# Patient Record
Sex: FEMALE | Race: BLACK OR AFRICAN AMERICAN | NOT HISPANIC OR LATINO | Employment: UNEMPLOYED | URBAN - METROPOLITAN AREA
[De-identification: names, ages, dates, MRNs, and addresses within clinical notes are randomized per-mention and may not be internally consistent; named-entity substitution may affect disease eponyms.]

---

## 2020-04-21 DIAGNOSIS — Z01.84 ANTIBODY RESPONSE EXAMINATION: ICD-10-CM

## 2020-05-21 DIAGNOSIS — Z01.84 ANTIBODY RESPONSE EXAMINATION: ICD-10-CM

## 2020-06-20 DIAGNOSIS — Z01.84 ANTIBODY RESPONSE EXAMINATION: ICD-10-CM

## 2020-07-20 DIAGNOSIS — Z01.84 ANTIBODY RESPONSE EXAMINATION: ICD-10-CM

## 2020-08-19 DIAGNOSIS — Z01.84 ANTIBODY RESPONSE EXAMINATION: ICD-10-CM

## 2020-09-18 DIAGNOSIS — Z01.84 ANTIBODY RESPONSE EXAMINATION: ICD-10-CM

## 2020-10-18 DIAGNOSIS — Z01.84 ANTIBODY RESPONSE EXAMINATION: ICD-10-CM

## 2020-11-17 DIAGNOSIS — Z01.84 ANTIBODY RESPONSE EXAMINATION: ICD-10-CM

## 2021-07-30 ENCOUNTER — IMMUNIZATION (OUTPATIENT)
Dept: INTERNAL MEDICINE | Facility: CLINIC | Age: 41
End: 2021-07-30
Payer: MEDICAID

## 2021-07-30 DIAGNOSIS — Z23 NEED FOR VACCINATION: Primary | ICD-10-CM

## 2021-07-30 PROCEDURE — 0011A COVID-19, MRNA, LNP-S, PF, 100 MCG/0.5 ML DOSE VACCINE: CPT | Mod: PBBFAC,PO

## 2021-08-27 ENCOUNTER — CLINICAL SUPPORT (OUTPATIENT)
Dept: URGENT CARE | Facility: CLINIC | Age: 41
End: 2021-08-27
Payer: MEDICAID

## 2021-08-27 ENCOUNTER — IMMUNIZATION (OUTPATIENT)
Dept: INTERNAL MEDICINE | Facility: CLINIC | Age: 41
End: 2021-08-27
Payer: OTHER GOVERNMENT

## 2021-08-27 DIAGNOSIS — Z11.52 ENCOUNTER FOR SCREENING LABORATORY TESTING FOR COVID-19 VIRUS: Primary | ICD-10-CM

## 2021-08-27 DIAGNOSIS — Z23 NEED FOR VACCINATION: Primary | ICD-10-CM

## 2021-08-27 LAB
CTP QC/QA: YES
SARS-COV-2 RDRP RESP QL NAA+PROBE: NEGATIVE

## 2021-08-27 PROCEDURE — U0002: ICD-10-PCS | Mod: QW,S$GLB,, | Performed by: PHYSICIAN ASSISTANT

## 2021-08-27 PROCEDURE — 0012A COVID-19, MRNA, LNP-S, PF, 100 MCG/0.5 ML DOSE VACCINE: CPT | Mod: CV19,,, | Performed by: INTERNAL MEDICINE

## 2021-08-27 PROCEDURE — U0002 COVID-19 LAB TEST NON-CDC: HCPCS | Mod: QW,S$GLB,, | Performed by: PHYSICIAN ASSISTANT

## 2021-08-27 PROCEDURE — 91301 COVID-19, MRNA, LNP-S, PF, 100 MCG/0.5 ML DOSE VACCINE: ICD-10-PCS | Mod: ,,, | Performed by: INTERNAL MEDICINE

## 2021-08-27 PROCEDURE — 91301 COVID-19, MRNA, LNP-S, PF, 100 MCG/0.5 ML DOSE VACCINE: CPT | Mod: ,,, | Performed by: INTERNAL MEDICINE

## 2021-08-27 PROCEDURE — 0012A COVID-19, MRNA, LNP-S, PF, 100 MCG/0.5 ML DOSE VACCINE: ICD-10-PCS | Mod: CV19,,, | Performed by: INTERNAL MEDICINE

## 2021-12-27 ENCOUNTER — HOSPITAL ENCOUNTER (EMERGENCY)
Facility: HOSPITAL | Age: 41
Discharge: HOME | End: 2021-12-27
Attending: EMERGENCY MEDICINE
Payer: OTHER GOVERNMENT

## 2021-12-27 VITALS
RESPIRATION RATE: 15 BRPM | TEMPERATURE: 100 F | HEART RATE: 97 BPM | DIASTOLIC BLOOD PRESSURE: 87 MMHG | SYSTOLIC BLOOD PRESSURE: 134 MMHG | OXYGEN SATURATION: 99 %

## 2021-12-27 DIAGNOSIS — U07.1 COVID-19: Primary | ICD-10-CM

## 2021-12-27 LAB
CTP QC/QA: YES
SARS-COV-2 RDRP RESP QL NAA+PROBE: POSITIVE

## 2021-12-27 PROCEDURE — 99282 EMERGENCY DEPT VISIT SF MDM: CPT | Mod: 25

## 2021-12-27 PROCEDURE — U0002 COVID-19 LAB TEST NON-CDC: HCPCS | Performed by: EMERGENCY MEDICINE

## 2021-12-27 NOTE — Clinical Note
"Yesi"Willow Brandon was seen and treated in our emergency department on 12/27/2021.     COVID-19 is present in our communities across the state. There is limited testing for COVID at this time, so not all patients can be tested. In this situation, your employee meets the following criteria:    Yesi Brandon has met the criteria for COVID-19 testing and has a POSITIVE result. She can return to work once they are asymptomatic for 72 hours without the use of fever reducing medications AND at least ten days from the first positive result.     If you have any questions or concerns, or if I can be of further assistance, please do not hesitate to contact me.    Sincerely,             Kenny Diego PA-C"

## 2021-12-28 NOTE — ED PROVIDER NOTES
Encounter Date: 12/27/2021       History     Chief Complaint   Patient presents with    COVID-19 Concerns     + exposure, + HA, and cough      41-year-old female presents ED with concern of COVID after known exposure.  She reports 2-3 day onset of intermittent headaches, cough, nasal congestion.  No shortness of breath, abdominal pain, nausea, vomiting, loss in taste or smell.  She states she is otherwise feeling well at this time with no other acute complaints.        Review of patient's allergies indicates:  No Known Allergies  No past medical history on file.  No past surgical history on file.  No family history on file.     Review of Systems   Constitutional: Negative for chills and fever.   HENT: Positive for congestion. Negative for sore throat.    Eyes: Negative for visual disturbance.   Respiratory: Positive for cough. Negative for shortness of breath.    Gastrointestinal: Negative for abdominal pain, nausea and vomiting.   Musculoskeletal: Positive for myalgias.   Neurological: Negative for headaches.       Physical Exam     Initial Vitals [12/27/21 1645]   BP Pulse Resp Temp SpO2   134/87 97 15 99.9 °F (37.7 °C) 99 %      MAP       --         Physical Exam    Constitutional: Vital signs are normal. She appears well-developed and well-nourished. She is cooperative.  Non-toxic appearance. She does not have a sickly appearance. She does not appear ill. No distress.   HENT:   Head: Normocephalic and atraumatic.   Eyes: EOM are normal.   Neck:   Normal range of motion.  Cardiovascular: Normal rate.   Abdominal: Normal appearance.   Musculoskeletal:      Cervical back: Normal range of motion.     Neurological: She is alert and oriented to person, place, and time. GCS eye subscore is 4. GCS verbal subscore is 5. GCS motor subscore is 6.   Psychiatric: She has a normal mood and affect. Her behavior is normal. Thought content normal. Cognition and memory are normal.         ED Course   Procedures  Labs Reviewed    SARS-COV-2 RDRP GENE - Abnormal; Notable for the following components:       Result Value    POC Rapid COVID Positive (*)     All other components within normal limits          Imaging Results    None          Medications - No data to display  Medical Decision Making:   Initial Assessment:   Patient presents with concern of COVID, reporting 2-3 day onset of COVID like symptoms.  She is vaccinated.  Known exposure.  Vitals grossly unremarkable.  Patient well-appearing.  Differential Diagnosis:   COVID  ED Management:  COVID test is positive.  Results discussed with patient.  She is otherwise well-appearing and stable for discharge home at this time.  Encouraged Tylenol/ibuprofen as needed, oral hydration, rest, continue social distancing.  ED return precautions discussed.                      Clinical Impression:   Final diagnoses:  [U07.1] COVID-19 (Primary)          ED Disposition Condition    Discharge Stable        ED Prescriptions     None        Follow-up Information     Follow up With Specialties Details Why Contact Info    Your Doctor               Kenny Diego PA-C  12/27/21 3995

## 2021-12-28 NOTE — ED NOTES
Pt presents to ED requesting to be tested for Covid.        Covid symptoms: HA and cough; ambulatory. No distress, chest pain, or SOB noted. Possible, recent exposure reported. Pt stable to be seen and discharged by ED provider. Will continue to monitor.

## 2021-12-28 NOTE — DISCHARGE INSTRUCTIONS

## 2022-08-23 ENCOUNTER — HOSPITAL ENCOUNTER (EMERGENCY)
Facility: HOSPITAL | Age: 42
Discharge: HOME | End: 2022-08-23
Attending: EMERGENCY MEDICINE
Payer: MEDICAID

## 2022-08-23 VITALS
SYSTOLIC BLOOD PRESSURE: 147 MMHG | RESPIRATION RATE: 18 BRPM | TEMPERATURE: 99 F | HEART RATE: 80 BPM | WEIGHT: 200 LBS | OXYGEN SATURATION: 97 % | DIASTOLIC BLOOD PRESSURE: 86 MMHG

## 2022-08-23 DIAGNOSIS — M79.89 LEG SWELLING: ICD-10-CM

## 2022-08-23 DIAGNOSIS — M71.21 POPLITEAL CYST, RIGHT: Primary | ICD-10-CM

## 2022-08-23 DIAGNOSIS — M25.561 RIGHT KNEE PAIN: ICD-10-CM

## 2022-08-23 PROCEDURE — 99284 PR EMERGENCY DEPT VISIT,LEVEL IV: ICD-10-PCS | Mod: ,,, | Performed by: PHYSICIAN ASSISTANT

## 2022-08-23 PROCEDURE — 99284 EMERGENCY DEPT VISIT MOD MDM: CPT | Mod: ,,, | Performed by: PHYSICIAN ASSISTANT

## 2022-08-23 PROCEDURE — 99282 EMERGENCY DEPT VISIT SF MDM: CPT | Mod: 25

## 2022-08-23 PROCEDURE — 25000003 PHARM REV CODE 250: Performed by: PHYSICIAN ASSISTANT

## 2022-08-23 RX ORDER — IBUPROFEN 600 MG/1
600 TABLET ORAL
Status: COMPLETED | OUTPATIENT
Start: 2022-08-23 | End: 2022-08-23

## 2022-08-23 RX ORDER — ACETAMINOPHEN 500 MG
1000 TABLET ORAL
Status: COMPLETED | OUTPATIENT
Start: 2022-08-23 | End: 2022-08-23

## 2022-08-23 RX ADMIN — IBUPROFEN 600 MG: 600 TABLET ORAL at 06:08

## 2022-08-23 RX ADMIN — ACETAMINOPHEN 1000 MG: 500 TABLET ORAL at 06:08

## 2022-08-23 NOTE — FIRST PROVIDER EVALUATION
Medical screening exam completed.  I have conducted a focused provider triage encounter, findings are as follows:    Brief history of present illness:  Right lower extremity pain and swelling x3 days.  No chest pain or shortness of breath    Vitals:    08/23/22 1628   Pulse: 80   Resp: 18   Temp: 98.8 °F (37.1 °C)   TempSrc: Oral   SpO2: 97%   Weight: 90.7 kg (200 lb)       Pertinent physical exam:  1 to 2+ edema to patient's right calf    Brief workup plan:  Right lower extremity Doppler to rule out DVT    Preliminary workup initiated; this workup will be continued and followed by the physician or advanced practice provider that is assigned to the patient when roomed.

## 2022-08-23 NOTE — ED NOTES
Sunday, right knee started swelling with pain to right calf.  Today, pt reports pain to posterior right knee and calf, pain increases on ambulation.  Pt denies trauma.      LOC: The patient is awake, alert and aware of environment with an appropriate affect, the patient is oriented x 3 and speaking appropriately.  APPEARANCE: Patient resting comfortably and in no acute distress, patient is clean and well groomed, patient's clothing is properly fastened.  SKIN: The skin is warm and dry, color consistent with ethnicity, patient has normal skin turgor and moist mucus membranes, skin intact, no breakdown or bruising noted.  MUSCULOSKELETAL: Patient moving all extremities spontaneously, no obvious swelling or deformities noted.  + pain to right calf  RESPIRATORY: Airway is open and patent, respirations are spontaneous, patient has a normal effort and rate, no accessory muscle use noted.

## 2022-08-23 NOTE — Clinical Note
"Yesi Martinalondra Chapman was seen and treated in our emergency department on 8/23/2022.  She may return to work on 08/24/2022.       If you have any questions or concerns, please don't hesitate to call.      Greg Gaytan PA-C"

## 2022-08-23 NOTE — ED PROVIDER NOTES
Encounter Date: 8/23/2022       History     Chief Complaint   Patient presents with    Leg Pain     Right leg pain, denies injury. X3 days. Reports tightness in leg, swelling noted.      The history is provided by the patient and medical records. No  was used.      Yesi Chapman is a 42 y.o. female with no reported medical history presenting to the ED with the chief complaint of right leg pain.     Patient reports atraumatic R knee pain for the past 3 days. Worsens with standing up after sitting down for a long time. Feels the top part of her knee is swollen. 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 Denies history of DVT/PE, birth control or estrogen supplementation, malignancy, recent travel/surgeries. No chest pain or SOB. No numbness, paresthesias, extremity weakness. History of tubal ligation.    Review of patient's allergies indicates:  No Known Allergies  History reviewed. No pertinent past medical history.  History reviewed. No pertinent surgical history.  History reviewed. No pertinent family history.     Review of Systems   Constitutional: Negative for fever.   HENT: Negative for sore throat.    Eyes: Negative for pain.   Respiratory: Negative for shortness of breath.    Cardiovascular: Negative for chest pain.   Gastrointestinal: Negative for nausea.   Genitourinary: Negative for dysuria.   Musculoskeletal: Positive for arthralgias. Negative for back pain.   Skin: Negative for rash.   Neurological: Negative for weakness.   Hematological: Does not bruise/bleed easily.       Physical Exam     Initial Vitals   BP Pulse Resp Temp SpO2   08/23/22 1629 08/23/22 1628 08/23/22 1628 08/23/22 1628 08/23/22 1628   (!) 147/86 80 18 98.8 °F (37.1 °C) 97 %      MAP       --                Physical Exam    Constitutional: She appears well-developed and well-nourished. She is not diaphoretic. No distress.   HENT:   Head: Normocephalic and atraumatic.   Mouth/Throat:  Oropharynx is clear and moist. No oropharyngeal exudate.   Eyes: Conjunctivae and EOM are normal. Pupils are equal, round, and reactive to light. No scleral icterus.   Neck: Neck supple.   Normal range of motion.  Cardiovascular: Normal rate and regular rhythm.   +2 DP pulses BLE   Pulmonary/Chest: Breath sounds normal. No respiratory distress. She has no wheezes.   Abdominal: Abdomen is soft. She exhibits no distension. There is no abdominal tenderness. There is no rebound.   Musculoskeletal:         General: Edema present. No tenderness. Normal range of motion.      Cervical back: Normal range of motion and neck supple.      Comments: Suprapatellar swelling to RLE. Full A/P ROM of lower extremities     Neurological: She is alert and oriented to person, place, and time. She has normal strength. No sensory deficit.   Skin: Skin is warm and dry. No rash noted. No erythema.   Psychiatric: She has a normal mood and affect.         ED Course   Procedures  Labs Reviewed - No data to display       Imaging Results          US Lower Extremity Veins Right (Final result)  Result time 08/23/22 20:55:57    Final result by Baljeet Mcgovern DO (08/23/22 20:55:57)                 Impression:      No evidence of deep venous thrombosis in the right lower extremity.    Right popliteal fossa cyst.      Electronically signed by: Baljeet Mcgovern  Date:    08/23/2022  Time:    20:55             Narrative:    EXAMINATION:  US LOWER EXTREMITY VEINS RIGHT    CLINICAL HISTORY:  Other specified soft tissue disorders    TECHNIQUE:  Duplex and color flow Doppler evaluation and graded compression of the right lower extremity veins was performed.    COMPARISON:  None    FINDINGS:  Right thigh veins: The common femoral, femoral, popliteal, upper greater saphenous, and deep femoral veins are patent and free of thrombus. The veins are normally compressible and have normal phasic flow and augmentation response.    Right calf veins: The visualized calf  veins are patent.    Contralateral CFV: The contralateral (left) common femoral vein is patent and free of thrombus.    Miscellaneous: There is a right popliteal fossa cyst measuring 5.4 x 1.0 x 1.1 cm.                               X-Ray Knee 1 or 2 View Right (Final result)  Result time 08/23/22 19:47:51    Final result by Greg Ross MD (08/23/22 19:47:51)                 Impression:      No acute displaced fracture-dislocation identified.      Electronically signed by: Greg Ross MD  Date:    08/23/2022  Time:    19:47             Narrative:    EXAMINATION:  XR KNEE 1 OR 2 VIEW RIGHT    CLINICAL HISTORY:  Pain in right knee    TECHNIQUE:  AP and lateral views of the right knee were performed.    COMPARISON:  Right knee series 08/16/2010    FINDINGS:  Bones are well mineralized. Overall alignment is within normal limits. No displaced fracture, dislocation or destructive osseous process.  No large suprapatellar joint effusion.  Minimal spurring of the posterior patella and medial tibial spine..  No subcutaneous emphysema or radiodense retained foreign body.                                 Medications   acetaminophen tablet 1,000 mg (1,000 mg Oral Given 8/23/22 1840)   ibuprofen tablet 600 mg (600 mg Oral Given 8/23/22 1840)     Medical Decision Making:   History:   Old Medical Records: I decided to obtain old medical records.  Old Records Summarized: records from clinic visits.  Clinical Tests:   Radiological Study: Ordered and Reviewed       APC / Resident Notes:   42 y.o. female with no reported medical history presenting to the ED c/o atraumatic R knee pain for the past 3 days. DDx includes but not limited to osteoarthritis, Baker's cyst, ligament injury, meniscus injury, DVT.                Imaging reviewed without evidence of DVT. R popliteal cyst noted. No fracture. Discussed imaging with patient and advised RICE protocol. Offered crutches but she declined. Ambulatory referral placed for Orthopedic  follow-up. Patient expresses understanding and agreeable to the plan. Return to ED precautions given for new, worsening, or concerning symptoms.    Clinical Impression:   Final diagnoses:  [M79.89] Leg swelling  [M25.561] Right knee pain  [M71.21] Popliteal cyst, right (Primary)          ED Disposition Condition    Discharge Stable        ED Prescriptions     None        Follow-up Information     Follow up With Specialties Details Why Contact Info Additional Information    [facility] 5[facility] Fl [facility]oped[facility]   [address], [address]  [facility] [address] [phone] [facility] - Main Building, 5th Floor Please park in [facility] and take Atrium elevator           Greg Gaytan PA-C  08/23/22 2260

## 2022-08-24 NOTE — DISCHARGE INSTRUCTIONS
Follow-up with Orthopedics for further evaluation. Use the below contact information to call their clinic. You may use over-the-counter Tylenol and Ibuprofen for pain control. Use the provided ACE wrap for compression support.     Return to the emergency room for new, worsening, or concerning symptoms.

## 2022-10-07 ENCOUNTER — OFFICE VISIT (OUTPATIENT)
Dept: ORTHOPEDICS | Facility: CLINIC | Age: 42
End: 2022-10-07
Payer: MEDICAID

## 2022-10-07 VITALS — HEIGHT: 66 IN | BODY MASS INDEX: 32.13 KG/M2 | WEIGHT: 199.94 LBS

## 2022-10-07 DIAGNOSIS — M94.261 CHONDROMALACIA OF RIGHT KNEE: Primary | ICD-10-CM

## 2022-10-07 DIAGNOSIS — M71.21 SYNOVIAL CYST OF POPLITEAL SPACE (BAKER), RIGHT KNEE: ICD-10-CM

## 2022-10-07 PROCEDURE — 99204 OFFICE O/P NEW MOD 45 MIN: CPT | Mod: S$PBB,,, | Performed by: ORTHOPAEDIC SURGERY

## 2022-10-07 PROCEDURE — 1160F PR REVIEW ALL MEDS BY PRESCRIBER/CLIN PHARMACIST DOCUMENTED: ICD-10-PCS | Mod: CPTII,,, | Performed by: ORTHOPAEDIC SURGERY

## 2022-10-07 PROCEDURE — 3008F PR BODY MASS INDEX (BMI) DOCUMENTED: ICD-10-PCS | Mod: CPTII,,, | Performed by: ORTHOPAEDIC SURGERY

## 2022-10-07 PROCEDURE — 1160F RVW MEDS BY RX/DR IN RCRD: CPT | Mod: CPTII,,, | Performed by: ORTHOPAEDIC SURGERY

## 2022-10-07 PROCEDURE — 99999 PR PBB SHADOW E&M-EST. PATIENT-LVL III: CPT | Mod: PBBFAC,,, | Performed by: ORTHOPAEDIC SURGERY

## 2022-10-07 PROCEDURE — 99204 PR OFFICE/OUTPT VISIT, NEW, LEVL IV, 45-59 MIN: ICD-10-PCS | Mod: S$PBB,,, | Performed by: ORTHOPAEDIC SURGERY

## 2022-10-07 PROCEDURE — 99213 OFFICE O/P EST LOW 20 MIN: CPT | Mod: PBBFAC,PN | Performed by: ORTHOPAEDIC SURGERY

## 2022-10-07 PROCEDURE — 3008F BODY MASS INDEX DOCD: CPT | Mod: CPTII,,, | Performed by: ORTHOPAEDIC SURGERY

## 2022-10-07 PROCEDURE — 1159F PR MEDICATION LIST DOCUMENTED IN MEDICAL RECORD: ICD-10-PCS | Mod: CPTII,,, | Performed by: ORTHOPAEDIC SURGERY

## 2022-10-07 PROCEDURE — 1159F MED LIST DOCD IN RCRD: CPT | Mod: CPTII,,, | Performed by: ORTHOPAEDIC SURGERY

## 2022-10-07 PROCEDURE — 99999 PR PBB SHADOW E&M-EST. PATIENT-LVL III: ICD-10-PCS | Mod: PBBFAC,,, | Performed by: ORTHOPAEDIC SURGERY

## 2022-10-07 NOTE — PROGRESS NOTES
Subjective:      Patient ID: Yesi Chapman is a 42 y.o. female.    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 Patient states the pain began suddenly and is localized along the posterior aspect of her knee. She denies any HEATHER or traumatic event.  Pain became so great she went to the ED, were u/s revealed a Baker cyst.  Since this visit, she has had mild to moderate knee pain mostly along the posterior and medial aspect of the knee made worse when attempting to stand after prolonged sitting.  She rates the pain as 0/10 at rest, but increases with the activities described above.  She has attempted multiple conservative measures that include activity modification, ice & elevation, and anti-inflammatories with little relief. She admits to mechanical symptoms to include locking and catching as well as subjective instability.  Is affecting ADLs and limiting desired level of activity. Denies numbness, tingling, radiation, and inability to bear weight. She is here today to discuss treatment options.    No previous surgeries or trauma on right knee    Review of Systems   Constitutional: Negative.   HENT: Negative.     Eyes: Negative.    Cardiovascular: Negative.    Respiratory: Negative.     Endocrine: Negative.    Hematologic/Lymphatic: Negative.    Skin: Negative.    Musculoskeletal:  Positive for joint pain and joint swelling. Negative for arthritis, falls, muscle weakness and stiffness.   Neurological: Negative.    Psychiatric/Behavioral: Negative.     Allergic/Immunologic: Negative.        Objective:            General    Nursing note and vitals reviewed.  Constitutional: She is oriented to person, place, and time. She appears well-developed and well-nourished. No distress.   HENT:   Head: Normocephalic and atraumatic.   Nose: Nose normal.   Eyes: EOM are normal.   Cardiovascular:  Intact distal pulses.             Pulmonary/Chest: Effort normal. No respiratory distress.   Neurological: She is alert and oriented to person, place, and time.   Psychiatric: She has a normal mood and affect. Her behavior is normal. Judgment and thought content normal.           Right Knee Exam     Inspection   Erythema: absent  Scars: absent  Swelling: absent  Effusion: absent  Deformity: absent  Bruising: absent    Tenderness   The patient is tender to palpation of the patella.    Range of Motion   Extension:  -5   Flexion:  130     Tests   Meniscus   Bonita:  Medial - negative Lateral - negative  Ligament Examination   Lachman: normal (-1 to 2mm)   PCL-Posterior Drawer: normal (0 to 2mm)     MCL - Valgus: normal (0 to 2mm)  LCL - Varus: normal  Patella   Patellar apprehension: negative  Passive Patellar Tilt: neutral  Patellar Tracking: normal  Patellar Grind: positive    Other   Muscle Tightness: hamstring tightness  Sensation: normal    Left Knee Exam     Inspection   Erythema: absent  Scars: absent  Swelling: absent  Effusion: absent  Deformity: absent  Bruising: absent    Tenderness   The patient is experiencing no tenderness.     Range of Motion   Extension:  -5   Flexion:  130     Tests   Meniscus   Bonita:  Medial - negative Lateral - negative  Stability   Lachman: normal (-1 to 2mm)   PCL-Posterior Drawer: normal (0 to 2mm)  MCL - Valgus: normal (0 to 2mm)  LCL - Varus: normal (0 to 2mm)  Patella   Patellar apprehension: negative  Passive Patellar Tilt: neutral  Patellar Tracking: normal  Patellar Grind: negative    Other   Muscle Tightness: hamstring tightness  Sensation: normal    Muscle Strength   Right Lower Extremity   Quadriceps:  5/5   Hamstrin/5   Left Lower Extremity   Quadriceps:  5/5   Hamstrin/5     Vascular Exam     Right Pulses  Dorsalis Pedis:      2+  Posterior Tibial:      2+        Left Pulses  Dorsalis Pedis:      2+  Posterior Tibial:      2+        Edema  Right Lower Leg: absent  Left Lower Leg:  absent    Radiographs right knee     My interpretation:     Joint space narrowing visualized of the medial compartment with some osteophyte formation        Assessment:       Encounter Diagnoses   Name Primary?    Chondromalacia of right knee Yes    Synovial cyst of popliteal space (Lange), right knee           Plan:       Yesi was seen today for pain.    Diagnoses and all orders for this visit:    Chondromalacia of right knee  -     Ambulatory referral/consult to Physical/Occupational Therapy; Future    Synovial cyst of popliteal space (Baker), right knee  -     Ambulatory referral/consult to Physical/Occupational Therapy; Future    1. I independently reviewed and interpreted previous radiographs and/or MRIs today. These images were shown to the patient where I then discussed my findings in detail.    2. We discussed at length different treatment options including conservative vs surgical management. These include anti-inflammatories, acetaminophen, rest, ice, heat, formal physical therapy including strengthening and stretching exercises, home exercise programs, dry needling, and finally surgical intervention.  Explained to the patient that the source of her pain and possible cause of the Baker cyst could be due to a possible medial meniscus tear.  Furthermore, she could have focal chondromalacia in this area as well.  We discussed possibly aspirating her Baker cyst to see if this relieves her pain.  Alternatively, we could continue to treat this conserve leave with anti-inflammatories as well as a possible course of formal physical therapy.  At this time patient would like to proceed with PT and will consider aspiration of Baker cyst in the future.    3. Ambulatory referral for formal physical therapy at [facility] with focus on Patellofemoral and Quad strengthening    4. Ice compress to the affected area 2-3x a day for 15-20 minutes as needed for pain management.  Ibuprofen p.r.n. pain    5. RTC to see  Ace Sapp PA-C in 7 weeks for follow-up.  Will reassess knee pain and determine if patient could benefit from aspiration or if MRI is warranted to assess integrity of medial meniscus.  Will obtain repeat x-rays at this appointment.      All of the patient's questions were answered. Patient was advised to call the clinic or contact me through the patient portal for any questions or concerns.       Medical Dictation software was used during the dictation of portions or the entirety of this medical record.  Phonetic or grammatic errors may exist due to the use of this software. For clarification, refer to the author of the document.

## 2022-10-19 ENCOUNTER — CLINICAL SUPPORT (OUTPATIENT)
Dept: REHABILITATION | Facility: HOSPITAL | Age: 42
End: 2022-10-19
Payer: MEDICAID

## 2022-10-19 DIAGNOSIS — M94.261 CHONDROMALACIA OF RIGHT KNEE: ICD-10-CM

## 2022-10-19 DIAGNOSIS — M25.561 ACUTE PAIN OF RIGHT KNEE: ICD-10-CM

## 2022-10-19 DIAGNOSIS — M71.21 SYNOVIAL CYST OF POPLITEAL SPACE (BAKER), RIGHT KNEE: ICD-10-CM

## 2022-10-19 DIAGNOSIS — R53.1 DECREASED STRENGTH: ICD-10-CM

## 2022-10-19 PROCEDURE — 97161 PT EVAL LOW COMPLEX 20 MIN: CPT

## 2022-10-19 NOTE — PLAN OF CARE
[facility]   Physical Therapy Initial Evaluation     Date: 10/19/2022   Name: Yesi Chapman  Clinic Number: [id number]    Therapy Diagnosis:   Encounter Diagnoses   Name Primary?    Chondromalacia of right knee     Synovial cyst of popliteal space (Baker), right knee     Acute pain of right knee     Decreased strength      Physician: Phillip Sapp*    Physician Orders: PT Eval and Treat   Medical Diagnosis from Referral:   M94.261 (ICD-10-CM) - Chondromalacia of right knee   M71.21 (ICD-10-CM) - Synovial cyst of popliteal space (Baker), right knee     Evaluation Date: 10/19/2022  Authorization Period Expiration: 10/7/23  Plan of Care Expiration: 1/31/23  Progress Note Due: 11/19/22  Visit # / Visits authorized: 1/ 1   FOTO: 42% limitation  FOTO 1st Follow Up:  FOTO 2nd Follow Up:    Precautions: Standard     Time In: 16:59  Time Out: 17:51  Total Appointment Time (timed & untimed codes): 52 minutes      SUBJECTIVE     Date of onset: September    History of current condition - Yesi reports: pain in her right knee has been bothering her since September with insidious onset. She notices increased edema that changes everyday. Pain gets worse with prolonged sitting, standing, and when transitioning to standing and walking for to long. She also gets pain when squatting activities and has to modify at times. She reports pain is on the anterior knee and feels like its underneath her kneecap. She also feels like it catches and gives out at times. She denies any changes in activity or work schedule. Had a prior knee surgery when she was 10 and had removal of excess bone growth.     Falls: none    Prior Therapy: none  Occupation: medical assistance, mostly sedentary with sometime moving around  Prior Level of Function: independent  Current Level of Function: difficulty with ambulation, squatting, and work activities.     Pain:  Current 6/10, worst 10/10, best 0/10   Location: right knee  .    Description: Aching, Dull, and Deep  Aggravating Factors: Sitting, Walking, Flexing, and prolonged positions  Easing Factors: pain medication and rest    Patients goals: decrease her pain and be able to perform work and everyday activities     Medical History:   No past medical history on file.    Surgical History:   Yesi Chapman  has no past surgical history on file.    Medications:   Yesi currently has no medications in their medication list.    Allergies:   Review of patient's allergies indicates:  No Known Allergies       OBJECTIVE     Observation: ambulation with slight antalgic gait on right.     Functional tests:   DL squat: decreased depth nya, anterior translation of nya knees, weight shift to left     Range of Motion (Passive):   Knee Right  Left    Flexion 115* 120   Extension 0* +2(hyper)   -pain in posterior knee during knee ext, anterior with knee flex deep to patella    Range of Motion (Active):   Knee Right  Left    Flexion 115* 120   Extension 0* 0       Lower Extremity Strength  Right LE  Left LE    Quadriceps: 3+/5 Quadriceps: 5/5   Hamstrings: 3+/5 Hamstrings: 4/5   Hip flexion (seated): NT Hip flexion (seated): NT   Hip extension:  3+/5 Hip extension: 4-/5   Hip abd: 3-/5 Hip abd:  3+/5   Hip ER:  3+/5 Hip ER:  4/5   Hip IR: 4-/5 Hip IR: 4+/5   - quad activation on right painful underneath patella    Special Tests:   Right Left   Valgus Stress Test NT NT   Varus Stress test NT NT   Lachman's test NT NT   Posterior Lachman NT NT   Ken's Test negative negative   Thessaly's Test NT NT   Patellar Grind Test positive negative     Joint Mobility: increased patella mobility on right.      Palpation: TTP med/lat patella, fat pad, inferior patella     Sensation: NT    Flexibility:    Hamstrings: R = NT ; L = NT    Ness's test: R = NT ; L = NT   Clay test: R = NT ; L = NT   Ely: R = negative; L = Negative    Edema: slight increased edema on right knee. Increased edema on posterior knee.        Limitation/Restriction for FOTO knee Survey    Therapist reviewed FOTO scores for Yesi Chapman on 10/19/2022.   FOTO documents entered into [employer] - see Media section.    Limitation Score: 42%         TREATMENT     Total Treatment time (time-based codes) separate from Evaluation: 10 minutes      Yesi received the treatments listed below:      therapeutic exercises to develop strength for 10 minutes including:  Sidelying hip ER 2x10 each  Clams with red TB 15x each  Prone hip ext 10x each      PATIENT EDUCATION AND HOME EXERCISES     Education provided:   - HEP  - importance of hip and glute strengthening    Written Home Exercises Provided: yes. Exercises were reviewed and Yesi was able to demonstrate them prior to the end of the session.  Yesi demonstrated good  understanding of the education provided. See EMR under Patient Instructions for exercises provided during therapy sessions.    ASSESSMENT     Yesi is a 42 y.o. female referred to outpatient Physical Therapy with a medical diagnosis of Chondromalacia of right knee. Patient presents with decreased LE strength, abnormal gait, decreased quad control, decreased activity tolerance, and pain that is affecting everyday work and home activities. She presents with signs and symptoms consistent with PFPS secondary due to decreased hip, glute and quad strengthening.     Patient prognosis is Good.   Patient will benefit from skilled outpatient Physical Therapy to address the deficits stated above and in the chart below, provide patient /family education, and to maximize patientt's level of independence.     Plan of care discussed with patient: Yes  Patient's spiritual, cultural and educational needs considered and patient is agreeable to the plan of care and goals as stated below:     Anticipated Barriers for therapy: work    Medical Necessity is demonstrated by the following  History  Co-morbidities and personal factors that may impact the plan of care  Co-morbidities:   high BMI and HTN    Personal Factors:   lifestyle     moderate   Examination  Body Structures and Functions, activity limitations and participation restrictions that may impact the plan of care Body Regions:   lower extremities  trunk    Body Systems:    gross symmetry  ROM  strength  gross coordinated movement  motor control  motor learning    Participation Restrictions:   work    Activity limitations:   Learning and applying knowledge  no deficits    General Tasks and Commands  no deficits    Communication  no deficits    Mobility  walking    Self care  no deficits    Domestic Life  no deficits    Interactions/Relationships  no deficits    Life Areas  no deficits    Community and Social Life  no deficits         high   Clinical Presentation stable and uncomplicated low   Decision Making/ Complexity Score: low     GOALS: Short Term Goals:  4-6 weeks  1.Report decreased knee pain  < / =  2/10  to increase tolerance for ambulation and work activities  2. Increase knee ROM to full pain free in order to be able to perform ADLs without difficulty.  3. Increase strength by 1/3 MMT grade in quad  to increase tolerance for ADL and work activities.  4. Pt to tolerate HEP to improve ROM and independence with ADL's    Long Term Goals: 10-12 weeks  1.Report decreased knee pain < / = 0/10  to increase tolerance for ambulation and work  2.Patient goal: return to pain free work and ADL activities  3.Increase strength to >/= 4/5 in hip/quad  to increase tolerance for ADL and work activities.  4. Pt will report at CJ level (20-40% impaired) on FOTO knee to demonstrate increase in LE function with every day tasks.       PLAN   Plan of care Certification: 10/19/2022 to 1/31/22.    Outpatient Physical Therapy 1 times weekly for 14 weeks to include the following interventions: Gait Training, Manual Therapy, Moist Heat/ Ice, Neuromuscular Re-ed, Patient Education, Self Care, Therapeutic Activities, and Therapeutic  Exercise.     Aleksandr Jones, PT      I CERTIFY THE NEED FOR THESE SERVICES FURNISHED UNDER THIS PLAN OF TREATMENT AND WHILE UNDER MY CARE   Physician's comments:     Physician's Signature: ___________________________________________________

## 2022-10-25 ENCOUNTER — DOCUMENTATION ONLY (OUTPATIENT)
Dept: REHABILITATION | Facility: HOSPITAL | Age: 42
End: 2022-10-25

## 2022-10-25 NOTE — PROGRESS NOTES
PT called patient after patient had not arrived for appointment today to follow-up with patient.    Communication: Patient answered and stated she had something come up and when she went to call and cancel she could not find the number so she was unable to. She confirmed her appointment next week and stated she would be there.   Patient Confirmed Next Appointment: Yes  Number of No-Shows To Date: 1  Number of Same-Day Cancels To Date: 0  Notes: See above    Devika Dillard, PT

## 2022-10-28 ENCOUNTER — TELEPHONE (OUTPATIENT)
Dept: ORTHOPEDICS | Facility: CLINIC | Age: 42
End: 2022-10-28
Payer: MEDICAID

## 2022-10-28 NOTE — TELEPHONE ENCOUNTER
Spoke with patient mother in regards to her appointment being rescheduled due to our PA being out of office that day. Patient mother was informed of the new appointment date, time and location. A reminder will also be sent to the patient portal.

## 2022-11-02 ENCOUNTER — CLINICAL SUPPORT (OUTPATIENT)
Dept: REHABILITATION | Facility: HOSPITAL | Age: 42
End: 2022-11-02
Payer: COMMERCIAL

## 2022-11-02 DIAGNOSIS — R53.1 DECREASED STRENGTH: ICD-10-CM

## 2022-11-02 DIAGNOSIS — M25.561 ACUTE PAIN OF RIGHT KNEE: Primary | ICD-10-CM

## 2022-11-02 PROCEDURE — 97110 THERAPEUTIC EXERCISES: CPT

## 2022-11-02 NOTE — PROGRESS NOTES
[facility]   Physical Therapy Treatment Note     Name: Yesi Chapman  Clinic Number: [id number]    Therapy Diagnosis:   Encounter Diagnoses   Name Primary?    Acute pain of right knee Yes    Decreased strength      Physician: Phillip Sapp*    Visit Date: 11/2/2022         Encounter Diagnoses   Name Primary?    Chondromalacia of right knee      Synovial cyst of popliteal space (Lange), right knee      Acute pain of right knee      Decreased strength           Physician Orders: PT Eval and Treat   Medical Diagnosis from Referral:   M94.261 (ICD-10-CM) - Chondromalacia of right knee   M71.21 (ICD-10-CM) - Synovial cyst of popliteal space (Lange), right knee       Time In: 5:03  Time Out: 6:00  Total Billable Time: 57 minutes    SUBJECTIVE     Pt reports: Still having consistent R knee pain when walking around during the day and sitting for prolonged periods of time.  She  was moderately  compliant with home exercise program.  Response to previous treatment: R knee feels about the same after IE.  Functional change: Non    Pain: 8/10  Location: right knee      OBJECTIVE     R patella hypermobility noted.    Treatment     Yesi received the treatments listed below:      therapeutic exercises to develop strength and ROM for 42 minutes including:  R knee AROM- heel slides  R quad set x30, 3s holds  Hamstring set B x20  S/L clamshell 30x B  Supine bridge x 30  SAQ w/ ball under R knee x30 w/ 3s hold  Seated LAQ x30 B  Sit to stand small range to table x30      manual therapy techniques: Joint mobilizations were applied to the: R knee for 4 minutes, including:  R patella joint mobility assessment    neuromuscular re-education activities to improve: Proper muscle activation for 8 minutes. The following activities were included:  Quad set at wall w/ small ball x 30    therapeutic activities to improve functional performance for 0  minutes, including:      gait training to improve functional  mobility and safety for 0  minutes, including:      direct contact modalities after being cleared for contraindications:     supervised modalities after being cleared for contradictions:         Patient Education and Home Exercises     Home Exercises Provided and Patient Education Provided     Education provided:   - Keep R knee moving during the day to prevent build up of stiffness, swelling  -Importance of compliance with HEP    Written Home Exercises Provided: Patient instructed to cont prior HEP. Exercises were reviewed and Yesi was able to demonstrate them prior to the end of the session.  Yesi demonstrated good  understanding of the education provided. See EMR under Patient Instructions for exercises provided during therapy sessions    ASSESSMENT     Mod to Max cuing for proper R quad activation. R quad causes pain to activate at beginning of session but improves over time with repetitive cuing. No pain noted with R quad activation by end of session. Notes R knee fatigues quicker than L with quad strengthening work but minimal pain noted. Improved tolerance of CKC strengthening work at end of session.     Yesi Is progressing well towards her goals.   Pt prognosis is Excellent.     Pt will continue to benefit from skilled outpatient physical therapy to address the deficits listed in the problem list box on initial evaluation, provide pt/family education and to maximize pt's level of independence in the home and community environment.     Pt's spiritual, cultural and educational needs considered and pt agreeable to plan of care and goals.       Plan of care discussed with patient: Yes  Patient's spiritual, cultural and educational needs considered and patient is agreeable to the plan of care and goals as stated below:      Anticipated Barriers for therapy: work     Medical Necessity is demonstrated by the following  History  Co-morbidities and personal factors that may impact the plan of care Co-morbidities:    high BMI and HTN     Personal Factors:   lifestyle       moderate   Examination  Body Structures and Functions, activity limitations and participation restrictions that may impact the plan of care Body Regions:   lower extremities  trunk     Body Systems:    gross symmetry  ROM  strength  gross coordinated movement  motor control  motor learning     Participation Restrictions:   work     Activity limitations:   Learning and applying knowledge  no deficits     General Tasks and Commands  no deficits     Communication  no deficits     Mobility  walking     Self care  no deficits     Domestic Life  no deficits     Interactions/Relationships  no deficits     Life Areas  no deficits     Community and Social Life  no deficits             high   Clinical Presentation stable and uncomplicated low   Decision Making/ Complexity Score: low      GOALS: Short Term Goals:  4-6 weeks  1.Report decreased knee pain  < / =  2/10  to increase tolerance for ambulation and work activities  2. Increase knee ROM to full pain free in order to be able to perform ADLs without difficulty.  3. Increase strength by 1/3 MMT grade in quad  to increase tolerance for ADL and work activities.  4. Pt to tolerate HEP to improve ROM and independence with ADL's     Long Term Goals: 10-12 weeks  1.Report decreased knee pain < / = 0/10  to increase tolerance for ambulation and work  2.Patient goal: return to pain free work and ADL activities  3.Increase strength to >/= 4/5 in hip/quad  to increase tolerance for ADL and work activities.  4. Pt will report at CJ level (20-40% impaired) on FOTO knee to demonstrate increase in LE function with every day tasks.           PLAN     Pt will continue to benefit from PT with focus on R quad activation, strengthening, B hip abd, ext strengthening in order to facilitate return to unrestricted sitting, walking activity as is required for work.     Lito Trejo, PT

## 2022-11-16 ENCOUNTER — DOCUMENTATION ONLY (OUTPATIENT)
Dept: REHABILITATION | Facility: HOSPITAL | Age: 42
End: 2022-11-16
Payer: MEDICAID

## 2022-11-16 NOTE — PROGRESS NOTES
Attempted to call patient about 2nd missed appointment in a row. Phone call went to voicemail and unable to leave a message due to mail box being full.

## 2022-11-30 ENCOUNTER — CLINICAL SUPPORT (OUTPATIENT)
Dept: REHABILITATION | Facility: HOSPITAL | Age: 42
End: 2022-11-30
Payer: COMMERCIAL

## 2022-11-30 DIAGNOSIS — R53.1 DECREASED STRENGTH: ICD-10-CM

## 2022-11-30 DIAGNOSIS — M25.561 ACUTE PAIN OF RIGHT KNEE: Primary | ICD-10-CM

## 2022-11-30 PROCEDURE — 97110 THERAPEUTIC EXERCISES: CPT

## 2022-11-30 NOTE — PROGRESS NOTES
"[facility]   Physical Therapy Treatment Note     Name: Yesi Chapman  Clinic Number: [id number]    Therapy Diagnosis:   Encounter Diagnoses   Name Primary?    Acute pain of right knee Yes    Decreased strength        Physician: Phillip Sapp*    Visit Date: 11/30/2022         Encounter Diagnoses   Name Primary?    Chondromalacia of right knee      Synovial cyst of popliteal space (Lange), right knee      Acute pain of right knee      Decreased strength           Physician Orders: PT Eval and Treat   Medical Diagnosis from Referral:   M94.261 (ICD-10-CM) - Chondromalacia of right knee   M71.21 (ICD-10-CM) - Synovial cyst of popliteal space (Lange), right knee       Time In: 5:00  Time Out: 6:00  Total Billable Time: 40 minutes    SUBJECTIVE     Pt reports: had to miss some time due to family emergencies. Her knee is still bothering her and still getting worse with activity.     She  was moderately  compliant with home exercise program.  Response to previous treatment: R knee feels about the same after IE.  Functional change: Non    Pain: 8/10  Location: right knee      OBJECTIVE     R patella hypermobility noted.    Treatment     Yesi received the treatments listed below:      therapeutic exercises to develop strength and ROM for 40 minutes including:  R knee AROM- heel slides  Quad sets with towel 30x5"  Quad sets without towel 20x5"  SAQ with towel and 1/2 foam 30x5"  Clams with red TB 3x12 each  Bridges 20x5"  Seated LAQ 3x12  R quad set x30, 3s holds  Hamstring set B x20    manual therapy techniques: Joint mobilizations were applied to the: R knee for 15 minutes, including:  Knee assessment  R patella joint mobility assessment  Right tibiofemoral joint mobilizations  Pt education for HEP      Patient Education and Home Exercises     Home Exercises Provided and Patient Education Provided     Education provided:   - Keep R knee moving during the day to prevent build up of " stiffness, swelling  -Importance of compliance with HEP    Written Home Exercises Provided: Patient instructed to cont prior HEP. Exercises were reviewed and Yesi was able to demonstrate them prior to the end of the session.  Yesi demonstrated good  understanding of the education provided. See EMR under Patient Instructions for exercises provided during therapy sessions    ASSESSMENT     Yesi presented following a short absence but showed improved range of motion and quad activation today. She was able to perform quad set with minimal cueing and progressed with range. She also was able to progress with range of motion and hip/glute strengthening. Will continue to progress as tolerated.     eYsi Is progressing well towards her goals.   Pt prognosis is Excellent.     Pt will continue to benefit from skilled outpatient physical therapy to address the deficits listed in the problem list box on initial evaluation, provide pt/family education and to maximize pt's level of independence in the home and community environment.     Pt's spiritual, cultural and educational needs considered and pt agreeable to plan of care and goals.       Plan of care discussed with patient: Yes  Patient's spiritual, cultural and educational needs considered and patient is agreeable to the plan of care and goals as stated below:      Anticipated Barriers for therapy: work     Medical Necessity is demonstrated by the following  History  Co-morbidities and personal factors that may impact the plan of care Co-morbidities:   high BMI and HTN     Personal Factors:   lifestyle       moderate   Examination  Body Structures and Functions, activity limitations and participation restrictions that may impact the plan of care Body Regions:   lower extremities  trunk     Body Systems:    gross symmetry  ROM  strength  gross coordinated movement  motor control  motor learning     Participation Restrictions:   work     Activity limitations:   Learning and  applying knowledge  no deficits     General Tasks and Commands  no deficits     Communication  no deficits     Mobility  walking     Self care  no deficits     Domestic Life  no deficits     Interactions/Relationships  no deficits     Life Areas  no deficits     Community and Social Life  no deficits             high   Clinical Presentation stable and uncomplicated low   Decision Making/ Complexity Score: low      GOALS: Short Term Goals:  4-6 weeks  1.Report decreased knee pain  < / =  2/10  to increase tolerance for ambulation and work activities  2. Increase knee ROM to full pain free in order to be able to perform ADLs without difficulty.  3. Increase strength by 1/3 MMT grade in quad  to increase tolerance for ADL and work activities.  4. Pt to tolerate HEP to improve ROM and independence with ADL's     Long Term Goals: 10-12 weeks  1.Report decreased knee pain < / = 0/10  to increase tolerance for ambulation and work  2.Patient goal: return to pain free work and ADL activities  3.Increase strength to >/= 4/5 in hip/quad  to increase tolerance for ADL and work activities.  4. Pt will report at CJ level (20-40% impaired) on FOTO knee to demonstrate increase in LE function with every day tasks.           PLAN     Pt will continue to benefit from PT with focus on R quad activation, strengthening, B hip abd, ext strengthening in order to facilitate return to unrestricted sitting, walking activity as is required for work.     Aleksandr Jones, PT

## 2022-12-02 DIAGNOSIS — N92.0 MENORRHAGIA WITH REGULAR CYCLE: Primary | ICD-10-CM

## 2022-12-02 DIAGNOSIS — Z12.31 ENCOUNTER FOR SCREENING MAMMOGRAM FOR MALIGNANT NEOPLASM OF BREAST: Primary | ICD-10-CM

## 2022-12-13 ENCOUNTER — HOSPITAL ENCOUNTER (OUTPATIENT)
Dept: RADIOLOGY | Facility: OTHER | Age: 42
Discharge: HOME | End: 2022-12-13
Attending: FAMILY MEDICINE
Payer: COMMERCIAL

## 2022-12-13 ENCOUNTER — OFFICE VISIT (OUTPATIENT)
Dept: URGENT CARE | Facility: CLINIC | Age: 42
End: 2022-12-13
Payer: COMMERCIAL

## 2022-12-13 VITALS
RESPIRATION RATE: 18 BRPM | SYSTOLIC BLOOD PRESSURE: 115 MMHG | HEIGHT: 66 IN | HEART RATE: 79 BPM | BODY MASS INDEX: 34.07 KG/M2 | TEMPERATURE: 99 F | OXYGEN SATURATION: 100 % | DIASTOLIC BLOOD PRESSURE: 75 MMHG | WEIGHT: 212 LBS

## 2022-12-13 DIAGNOSIS — J06.9 VIRAL URI WITH COUGH: ICD-10-CM

## 2022-12-13 DIAGNOSIS — N92.0 MENORRHAGIA WITH REGULAR CYCLE: ICD-10-CM

## 2022-12-13 DIAGNOSIS — R05.9 COUGH, UNSPECIFIED TYPE: Primary | ICD-10-CM

## 2022-12-13 LAB
CTP QC/QA: YES
CTP QC/QA: YES
POC MOLECULAR INFLUENZA A AGN: NEGATIVE
POC MOLECULAR INFLUENZA B AGN: NEGATIVE
SARS-COV-2 AG RESP QL IA.RAPID: NEGATIVE

## 2022-12-13 PROCEDURE — 3008F PR BODY MASS INDEX (BMI) DOCUMENTED: ICD-10-PCS | Mod: CPTII,S$GLB,,

## 2022-12-13 PROCEDURE — 1159F MED LIST DOCD IN RCRD: CPT | Mod: CPTII,S$GLB,,

## 2022-12-13 PROCEDURE — 76830 US PELVIS COMP WITH TRANSVAG NON-OB (XPD): ICD-10-PCS | Mod: 26,,, | Performed by: RADIOLOGY

## 2022-12-13 PROCEDURE — 87811 SARS-COV-2 COVID19 W/OPTIC: CPT | Mod: QW,S$GLB,,

## 2022-12-13 PROCEDURE — 99203 PR OFFICE/OUTPT VISIT, NEW, LEVL III, 30-44 MIN: ICD-10-PCS | Mod: S$GLB,,,

## 2022-12-13 PROCEDURE — 3078F DIAST BP <80 MM HG: CPT | Mod: CPTII,S$GLB,,

## 2022-12-13 PROCEDURE — 76856 US PELVIS COMP WITH TRANSVAG NON-OB (XPD): ICD-10-PCS | Mod: 26,,, | Performed by: RADIOLOGY

## 2022-12-13 PROCEDURE — 1159F PR MEDICATION LIST DOCUMENTED IN MEDICAL RECORD: ICD-10-PCS | Mod: CPTII,S$GLB,,

## 2022-12-13 PROCEDURE — 76830 TRANSVAGINAL US NON-OB: CPT | Mod: 26,,, | Performed by: RADIOLOGY

## 2022-12-13 PROCEDURE — 3074F SYST BP LT 130 MM HG: CPT | Mod: CPTII,S$GLB,,

## 2022-12-13 PROCEDURE — 3074F PR MOST RECENT SYSTOLIC BLOOD PRESSURE < 130 MM HG: ICD-10-PCS | Mod: CPTII,S$GLB,,

## 2022-12-13 PROCEDURE — 3008F BODY MASS INDEX DOCD: CPT | Mod: CPTII,S$GLB,,

## 2022-12-13 PROCEDURE — 76830 TRANSVAGINAL US NON-OB: CPT | Mod: TC

## 2022-12-13 PROCEDURE — 1160F PR REVIEW ALL MEDS BY PRESCRIBER/CLIN PHARMACIST DOCUMENTED: ICD-10-PCS | Mod: CPTII,S$GLB,,

## 2022-12-13 PROCEDURE — 87811 SARS CORONAVIRUS 2 ANTIGEN POCT, MANUAL READ: ICD-10-PCS | Mod: QW,S$GLB,,

## 2022-12-13 PROCEDURE — 3078F PR MOST RECENT DIASTOLIC BLOOD PRESSURE < 80 MM HG: ICD-10-PCS | Mod: CPTII,S$GLB,,

## 2022-12-13 PROCEDURE — 87502 POCT INFLUENZA A/B MOLECULAR: ICD-10-PCS | Mod: QW,S$GLB,,

## 2022-12-13 PROCEDURE — 99203 OFFICE O/P NEW LOW 30 MIN: CPT | Mod: S$GLB,,,

## 2022-12-13 PROCEDURE — 76856 US EXAM PELVIC COMPLETE: CPT | Mod: 26,,, | Performed by: RADIOLOGY

## 2022-12-13 PROCEDURE — 87502 INFLUENZA DNA AMP PROBE: CPT | Mod: QW,S$GLB,,

## 2022-12-13 PROCEDURE — 1160F RVW MEDS BY RX/DR IN RCRD: CPT | Mod: CPTII,S$GLB,,

## 2022-12-13 RX ORDER — NAPROXEN 500 MG/1
TABLET ORAL
COMMUNITY
Start: 2022-12-01 | End: 2024-04-02

## 2022-12-13 RX ORDER — BENZONATATE 200 MG/1
200 CAPSULE ORAL 3 TIMES DAILY PRN
Qty: 30 CAPSULE | Refills: 0 | Status: SHIPPED | OUTPATIENT
Start: 2022-12-13 | End: 2022-12-23

## 2022-12-13 NOTE — PATIENT INSTRUCTIONS
- Rest.    - Drink plenty of fluids.  - Viral upper respiratory infections typically run their course in 10-14 days.      - You can take over-the-counter claritin, zyrtec, allegra, OR xyzal as directed. These are antihistamines that can help with runny nose, nasal congestion, sneezing, and helps to dry up post-nasal drip, which usually causes sore throat and cough.    - You can take plain Mucinex (guaifenesin) 1200 mg twice a day to help loosen mucous.     - You can use Flonase (fluticasone) nasal spray as directed for sinus congestion and postnasal drip. This is a steroid nasal spray that works locally over time to decrease the inflammation in your nose/sinuses and help with allergic symptoms. This is not an quick- relief spray like afrin, but it works well if used daily.  Discontinue if you develop nose bleed  - Use nasal saline prior to Flonase.  - Use Ocean Spray Nasal Saline 1-3 puffs each nostril every 2-3 hours then blow out onto tissue. This is to irrigate the nasal passage way to clear the sinus openings. Use until sinus problem resolved.    - A Neti Pot with sterile saline can help break up nasal congestion and give relief.      - Warm salt water gargles can help with sore throat     - Warm tea with honey can help with sore throat and cough. Honey is a natural cough suppressant.    - You must understand that you have received an Urgent Care treatment only and that you may be released before all of your medical problems are known or treated.   - You, the patient, will arrange for follow up care as instructed.   - If your condition worsens or fails to improve we recommend that you receive another evaluation at the ER immediately or contact your PCP to discuss your concerns or return here.   - Follow up with your PCP or specialty clinic as directed in the next 1-2 weeks if not improved or as needed.  You can call [phone] to schedule an appointment with the appropriate provider.    If your symptoms do  not improve or worsen, go to the emergency room immediately.

## 2022-12-13 NOTE — PROGRESS NOTES
"Subjective:       Patient ID: Yesi Chapman is a 42 y.o. female.    Vitals:  height is 5' 6" (1.676 m) and weight is 96.2 kg (212 lb). Her temperature is 98.7 °F (37.1 °C). Her blood pressure is 115/75 and her pulse is 79. Her respiration is 18 and oxygen saturation is 100%.     Chief Complaint: Cough    Pt presents with low grade fever, cough, nasal congestion, body aches, and chills beginning today. She has not yet treated symptoms at home. She reports a possible exposure to the flu. Denies fever. She is having chills and body aches. She denies taking anything for symptoms.     Cough  This is a new problem. The current episode started today. The problem has been unchanged. The problem occurs constantly. The cough is Productive of sputum. Associated symptoms include chills, myalgias and a sore throat. Pertinent negatives include no ear pain or nasal congestion. She has tried nothing for the symptoms.     Constitution: Positive for chills. Negative for sweating and fatigue.   HENT:  Positive for congestion, sinus pressure and sore throat. Negative for ear pain.    Eyes:  Negative for eye pain.   Respiratory:  Positive for cough.    Gastrointestinal:  Negative for nausea and vomiting.   Musculoskeletal:  Positive for muscle ache.   Skin:  Negative for hives.   Allergic/Immunologic: Negative for hives and itching.   Neurological:  Negative for disorientation and altered mental status.   Psychiatric/Behavioral:  Negative for altered mental status and disorientation.      Objective:      Physical Exam   Constitutional: She is oriented to person, place, and time. She appears well-developed. She is cooperative.  Non-toxic appearance. She does not appear ill. No distress.      Comments:Patient sits comfortably in exam chair. Answers questions in complete sentences. Does not show any signs of distress or discoloration.        HENT:   Head: Normocephalic and atraumatic.   Ears:   Right Ear: Hearing, tympanic membrane, " external ear and ear canal normal.   Left Ear: Hearing, tympanic membrane, external ear and ear canal normal.   Nose: Rhinorrhea and congestion present. No mucosal edema or nasal deformity. No epistaxis. Right sinus exhibits maxillary sinus tenderness. Right sinus exhibits no frontal sinus tenderness. Left sinus exhibits maxillary sinus tenderness. Left sinus exhibits no frontal sinus tenderness.   Mouth/Throat: Uvula is midline and mucous membranes are normal. No trismus in the jaw. Normal dentition. No uvula swelling. Posterior oropharyngeal erythema present. No oropharyngeal exudate or posterior oropharyngeal edema.   Eyes: Conjunctivae and lids are normal. No scleral icterus.   Neck: Trachea normal and phonation normal. Neck supple. No edema present. No erythema present. No neck rigidity present.   Cardiovascular: Normal rate, regular rhythm, normal heart sounds and normal pulses.   Pulmonary/Chest: Effort normal and breath sounds normal. No stridor. No respiratory distress. She has no decreased breath sounds. She has no wheezes. She has no rhonchi. She has no rales.   Abdominal: Normal appearance.   Musculoskeletal: Normal range of motion.         General: No deformity. Normal range of motion.   Lymphadenopathy:     She has cervical adenopathy.        Right cervical: Superficial cervical adenopathy present. No deep cervical and no posterior cervical adenopathy present.       Left cervical: Superficial cervical adenopathy present. No deep cervical and no posterior cervical adenopathy present.   Neurological: She is alert and oriented to person, place, and time. She exhibits normal muscle tone. Coordination normal.   Skin: Skin is warm, dry, intact, not diaphoretic and not pale.   Psychiatric: Her speech is normal and behavior is normal. Judgment and thought content normal.   Nursing note and vitals reviewed.      Results for orders placed or performed in visit on 12/13/22   SARS Coronavirus 2 Antigen, POCT  Manual Read   Result Value Ref Range    SARS Coronavirus 2 Antigen Negative Negative     Acceptable Yes    POCT Influenza A/B MOLECULAR   Result Value Ref Range    POC Molecular Influenza A Ag Negative Negative, Not Reported    POC Molecular Influenza B Ag Negative Negative, Not Reported     Acceptable Yes        Assessment:       1. Cough, unspecified type    2. Viral URI with cough          Plan:         Cough, unspecified type  -     SARS Coronavirus 2 Antigen, POCT Manual Read  -     POCT Influenza A/B MOLECULAR    Viral URI with cough  -     benzonatate (TESSALON) 200 MG capsule; Take 1 capsule (200 mg total) by mouth 3 (three) times daily as needed for Cough.  Dispense: 30 capsule; Refill: 0                 Patient Instructions   - Rest.    - Drink plenty of fluids.  - Viral upper respiratory infections typically run their course in 10-14 days.      - You can take over-the-counter claritin, zyrtec, allegra, OR xyzal as directed. These are antihistamines that can help with runny nose, nasal congestion, sneezing, and helps to dry up post-nasal drip, which usually causes sore throat and cough.    - You can take plain Mucinex (guaifenesin) 1200 mg twice a day to help loosen mucous.     - You can use Flonase (fluticasone) nasal spray as directed for sinus congestion and postnasal drip. This is a steroid nasal spray that works locally over time to decrease the inflammation in your nose/sinuses and help with allergic symptoms. This is not an quick- relief spray like afrin, but it works well if used daily.  Discontinue if you develop nose bleed  - Use nasal saline prior to Flonase.  - Use Ocean Spray Nasal Saline 1-3 puffs each nostril every 2-3 hours then blow out onto tissue. This is to irrigate the nasal passage way to clear the sinus openings. Use until sinus problem resolved.    - A Neti Pot with sterile saline can help break up nasal congestion and give relief.      - Warm salt  water gargles can help with sore throat     - Warm tea with honey can help with sore throat and cough. Honey is a natural cough suppressant.    - You must understand that you have received an Urgent Care treatment only and that you may be released before all of your medical problems are known or treated.   - You, the patient, will arrange for follow up care as instructed.   - If your condition worsens or fails to improve we recommend that you receive another evaluation at the ER immediately or contact your PCP to discuss your concerns or return here.   - Follow up with your PCP or specialty clinic as directed in the next 1-2 weeks if not improved or as needed.  You can call [phone] to schedule an appointment with the appropriate provider.    If your symptoms do not improve or worsen, go to the emergency room immediately.

## 2022-12-13 NOTE — LETTER
December 13, 2022      Urgent [facility] [address] [address]  Phone: [phone]  Fax: [phone]       Patient: Yesi Chapman   YOB: 1980  Date of Visit: 12/13/2022    To Whom It May Concern:    Rafael Chapman  was at [facility] on 12/13/2022. The patient may return to work/school on 12/15/22 with no restrictions. If you have any questions or concerns, or if I can be of further assistance, please do not hesitate to contact me.    Sincerely,    Brenna Mulligan PA-C

## 2022-12-16 ENCOUNTER — HOSPITAL ENCOUNTER (EMERGENCY)
Facility: HOSPITAL | Age: 42
Discharge: HOME | End: 2022-12-16
Attending: EMERGENCY MEDICINE
Payer: MEDICAID

## 2022-12-16 VITALS
RESPIRATION RATE: 16 BRPM | HEART RATE: 97 BPM | DIASTOLIC BLOOD PRESSURE: 75 MMHG | SYSTOLIC BLOOD PRESSURE: 120 MMHG | TEMPERATURE: 99 F | OXYGEN SATURATION: 97 %

## 2022-12-16 DIAGNOSIS — J06.9 VIRAL URI WITH COUGH: Primary | ICD-10-CM

## 2022-12-16 LAB
INFLUENZA A, MOLECULAR: NOT DETECTED
INFLUENZA B, MOLECULAR: NOT DETECTED
RSV AG BY MOLECULAR METHOD: NOT DETECTED
SARS-COV-2 RNA RESP QL NAA+PROBE: DETECTED

## 2022-12-16 PROCEDURE — 99284 EMERGENCY DEPT VISIT MOD MDM: CPT

## 2022-12-16 PROCEDURE — 99284 EMERGENCY DEPT VISIT MOD MDM: CPT | Mod: CR,CS,, | Performed by: EMERGENCY MEDICINE

## 2022-12-16 PROCEDURE — 96372 THER/PROPH/DIAG INJ SC/IM: CPT | Performed by: EMERGENCY MEDICINE

## 2022-12-16 PROCEDURE — 63600175 PHARM REV CODE 636 W HCPCS: Performed by: EMERGENCY MEDICINE

## 2022-12-16 PROCEDURE — 0241U SARS-COV2 (COVID) WITH FLU/RSV BY PCR: CPT | Performed by: EMERGENCY MEDICINE

## 2022-12-16 PROCEDURE — 99284 PR EMERGENCY DEPT VISIT,LEVEL IV: ICD-10-PCS | Mod: CR,CS,, | Performed by: EMERGENCY MEDICINE

## 2022-12-16 RX ORDER — PROMETHAZINE HYDROCHLORIDE 6.25 MG/5ML
12.5 SYRUP ORAL EVERY 6 HOURS PRN
Qty: 120 ML | Refills: 0 | Status: SHIPPED | OUTPATIENT
Start: 2022-12-16

## 2022-12-16 RX ORDER — DEXAMETHASONE SODIUM PHOSPHATE 4 MG/ML
8 INJECTION, SOLUTION INTRA-ARTICULAR; INTRALESIONAL; INTRAMUSCULAR; INTRAVENOUS; SOFT TISSUE
Status: COMPLETED | OUTPATIENT
Start: 2022-12-16 | End: 2022-12-16

## 2022-12-16 RX ADMIN — DEXAMETHASONE SODIUM PHOSPHATE 8 MG: 4 INJECTION INTRA-ARTICULAR; INTRALESIONAL; INTRAMUSCULAR; INTRAVENOUS; SOFT TISSUE at 01:12

## 2022-12-16 NOTE — ED NOTES
Reports nasal congestion, sore throat, productive cough, low grade fever since Tuesday. Reports neg covid/flu test at urgent care at that time. Reports worsening of s/s yesterday. Denies pain relief at home with OTC medications. RR even/unlabored, skin warm/dry

## 2022-12-16 NOTE — ED PROVIDER NOTES
Encounter Date: 12/16/2022       History     Chief Complaint   Patient presents with    Nasal Congestion     + chills, since Tuesday     42-year-old female without significant past medical history presents for evaluation of URI symptoms.  Reports onset of symptoms 3 days ago.  Symptoms include nasal congestion, cough productive of mucus.  Fever for 2 days, but has not had fever since then.  Reports right pain.  No drainage from the ear.  Reports that she was evaluated at urgent care and tested for flu and COVID, but symptoms not relieved with over-the-counter medication.    The history is provided by the patient.   Review of patient's allergies indicates:  No Known Allergies  No past medical history on file.  No past surgical history on file.  No family history on file.     Review of Systems   Constitutional:  Positive for chills and fever.   HENT:  Positive for congestion, ear pain, sinus pressure and sore throat.    Respiratory:  Positive for cough. Negative for shortness of breath.    Cardiovascular:  Negative for chest pain.   Gastrointestinal:  Negative for nausea.   Genitourinary:  Negative for dysuria.   Musculoskeletal:  Negative for back pain.   Skin:  Negative for rash.   Neurological:  Negative for weakness.   Hematological:  Does not bruise/bleed easily.     Physical Exam     Initial Vitals [12/16/22 0030]   BP Pulse Resp Temp SpO2   120/75 97 16 98.9 °F (37.2 °C) 97 %      MAP       --         Physical Exam    Vitals reviewed.  Constitutional: She appears well-developed and well-nourished.   HENT:   Head: Normocephalic and atraumatic.   Mouth/Throat: Oropharynx is clear and moist.   Bilateral TM with serous effusion, no erythema, no bulging   Eyes: Conjunctivae and EOM are normal. Pupils are equal, round, and reactive to light.   Neck: Phonation normal. Neck supple.   Normal range of motion.  Cardiovascular:  Normal rate, regular rhythm, normal heart sounds and normal pulses.           Pulmonary/Chest:  Breath sounds normal.   Abdominal: Abdomen is soft. Bowel sounds are normal. There is no abdominal tenderness.   Musculoskeletal:         General: Normal range of motion.      Cervical back: Normal range of motion and neck supple.     Neurological: She is alert and oriented to person, place, and time.   Skin: Skin is warm and dry.       ED Course   Procedures  Labs Reviewed   SARS-COV2 (COVID) WITH FLU/RSV BY PCR - Abnormal; Notable for the following components:       Result Value    SARS-CoV2 (COVID-19) Qualitative PCR Detected (*)     All other components within normal limits   HIV 1 / 2 ANTIBODY   HEPATITIS C ANTIBODY          Imaging Results    None          Medications   dexAMETHasone injection 8 mg (8 mg Intramuscular Given 12/16/22 0130)     Medical Decision Making:   History:   Old Medical Records: I decided to obtain old medical records.  Initial Assessment:   Evaluation of URI symptoms  Clinical Tests:   Lab Tests: Ordered and Reviewed  ED Management:  Patent shunt presenting with viral symptoms.  There is no signs of respiratory distress or hypoxia.  Breath sounds clear.  No indication for chest x-ray at this time.  Viral testing obtained.  COVID positive.  She is a candidate for Paxlovid which has been sent in to her pharmacy.  Phenergan prescribed for cough.  Over-the-counter cold medications recommended.  Return precautions advised.  Follow up as needed.  Isolation guidelines discussed with patient over the phone.                        Clinical Impression:   Final diagnoses:  [J06.9] Viral URI with cough (Primary)        ED Disposition Condition    Discharge Stable          ED Prescriptions       Medication Sig Dispense Start Date End Date Auth. Provider    promethazine (PHENERGAN) 6.25 mg/5 mL syrup Take 10 mLs (12.5 mg total) by mouth every 6 (six) hours as needed for Nausea (COUGH). 120 mL 12/16/2022 -- Billy Lazaro MD    nirmatrelvir-ritonavir 300 mg (150 mg x 2)-100 mg copackaged tablets  (EUA) Take 3 tablets by mouth 2 (two) times daily for 5 days. Each dose contains 2 nirmatrelvir (pink tablets) and 1 ritonavir (white tablet). Take all 3 tablets together 30 tablet 12/16/2022 12/21/2022 Billy Lazaro MD          Follow-up Information    None          Billy Lazaro MD  12/16/22 6272

## 2022-12-16 NOTE — DISCHARGE INSTRUCTIONS
Take over the counter medicines : afrin twice daily for 3 days only, claritin, mucinex and flonase   Use phenergan for cough   Drink lots of fluids, sleep with humidifier   If covid test positive, will send in paxlovid prescription to pharmacy

## 2023-02-01 ENCOUNTER — HOSPITAL ENCOUNTER (OUTPATIENT)
Dept: RADIOLOGY | Facility: HOSPITAL | Age: 43
Discharge: HOME | End: 2023-02-01
Attending: FAMILY MEDICINE
Payer: COMMERCIAL

## 2023-02-01 VITALS — WEIGHT: 213 LBS | BODY MASS INDEX: 34.23 KG/M2 | HEIGHT: 66 IN

## 2023-02-01 DIAGNOSIS — Z12.31 ENCOUNTER FOR SCREENING MAMMOGRAM FOR MALIGNANT NEOPLASM OF BREAST: ICD-10-CM

## 2023-02-01 PROCEDURE — 77067 MAMMO DIGITAL SCREENING BILAT WITH TOMO: ICD-10-PCS | Mod: 26,,, | Performed by: RADIOLOGY

## 2023-02-01 PROCEDURE — 77067 SCR MAMMO BI INCL CAD: CPT | Mod: TC

## 2023-02-01 PROCEDURE — 77063 MAMMO DIGITAL SCREENING BILAT WITH TOMO: ICD-10-PCS | Mod: 26,,, | Performed by: RADIOLOGY

## 2023-02-01 PROCEDURE — 77067 SCR MAMMO BI INCL CAD: CPT | Mod: 26,,, | Performed by: RADIOLOGY

## 2023-02-01 PROCEDURE — 77063 BREAST TOMOSYNTHESIS BI: CPT | Mod: 26,,, | Performed by: RADIOLOGY

## 2023-06-30 ENCOUNTER — PATIENT MESSAGE (OUTPATIENT)
Dept: RESEARCH | Facility: HOSPITAL | Age: 43
End: 2023-06-30
Payer: MEDICAID

## 2024-04-02 ENCOUNTER — HOSPITAL ENCOUNTER (EMERGENCY)
Facility: HOSPITAL | Age: 44
Discharge: HOME | End: 2024-04-02
Attending: EMERGENCY MEDICINE
Payer: COMMERCIAL

## 2024-04-02 VITALS
TEMPERATURE: 98 F | DIASTOLIC BLOOD PRESSURE: 75 MMHG | BODY MASS INDEX: 34.23 KG/M2 | OXYGEN SATURATION: 97 % | SYSTOLIC BLOOD PRESSURE: 113 MMHG | RESPIRATION RATE: 18 BRPM | HEART RATE: 64 BPM | HEIGHT: 66 IN | WEIGHT: 213 LBS

## 2024-04-02 DIAGNOSIS — M25.561 ACUTE PAIN OF RIGHT KNEE: Primary | ICD-10-CM

## 2024-04-02 DIAGNOSIS — M71.21 POPLITEAL CYST, UNRUPTURED, RIGHT: ICD-10-CM

## 2024-04-02 PROCEDURE — 99284 EMERGENCY DEPT VISIT MOD MDM: CPT | Mod: 25

## 2024-04-02 PROCEDURE — 25000003 PHARM REV CODE 250

## 2024-04-02 RX ORDER — NAPROXEN 500 MG/1
500 TABLET ORAL 2 TIMES DAILY WITH MEALS
Qty: 60 TABLET | Refills: 0 | Status: SHIPPED | OUTPATIENT
Start: 2024-04-02

## 2024-04-02 RX ORDER — NAPROXEN 500 MG/1
500 TABLET ORAL 2 TIMES DAILY WITH MEALS
Qty: 60 TABLET | Refills: 0 | Status: SHIPPED | OUTPATIENT
Start: 2024-04-02 | End: 2024-04-02

## 2024-04-02 RX ORDER — NAPROXEN 500 MG/1
500 TABLET ORAL
Status: COMPLETED | OUTPATIENT
Start: 2024-04-02 | End: 2024-04-02

## 2024-04-02 RX ORDER — OXYCODONE AND ACETAMINOPHEN 5; 325 MG/1; MG/1
1 TABLET ORAL
Status: COMPLETED | OUTPATIENT
Start: 2024-04-02 | End: 2024-04-02

## 2024-04-02 RX ADMIN — NAPROXEN 500 MG: 500 TABLET ORAL at 06:04

## 2024-04-02 RX ADMIN — OXYCODONE HYDROCHLORIDE AND ACETAMINOPHEN 1 TABLET: 5; 325 TABLET ORAL at 10:04

## 2024-04-02 NOTE — DISCHARGE INSTRUCTIONS
Take naproxen as prescribed.  Take with food as it may cause GI upset.  Apply gentle compression to area.  Activity as tolerated.  Follow up with primary care physician.    If you develop redness, swelling, fevers, worsening edema or any other new or concerning symptoms.

## 2024-04-02 NOTE — ED PROVIDER NOTES
Encounter Date: 4/2/2024       History     Chief Complaint   Patient presents with    Knee Pain     Right with swelling started Friday, but worse on today     Patient is a 43-year-old female presenting to the emergency department for right knee pain and swelling.  She began having right knee pain approximately 4 days ago.  She reports that over the past 4 days it has progressively worsened with increased swelling and pain with motion.  She has not taken any medication for it.  Patient denies any calf swelling redness.  Patient has not had any recent injections or procedures performed of the right knee.  She denies any fevers nausea vomiting.  Patient denies any redness, warmth of the knee.  Patient denies any oral contraceptives, smoking history, long distance recent travel, shortness of breath or chest pain at this time.    The history is provided by the patient.     Review of patient's allergies indicates:  No Known Allergies  History reviewed. No pertinent past medical history.  History reviewed. No pertinent surgical history.  No family history on file.  Social History     Tobacco Use    Smoking status: Never     Passive exposure: Never    Smokeless tobacco: Never       Physical Exam     Initial Vitals [04/02/24 0618]   BP Pulse Resp Temp SpO2   135/83 66 17 98.4 °F (36.9 °C) 98 %      MAP       --         Physical Exam    Nursing note and vitals reviewed.  Constitutional: She appears well-developed and well-nourished.   HENT:   Head: Normocephalic and atraumatic.   Cardiovascular:  Normal rate.           Pulmonary/Chest: Breath sounds normal.   Musculoskeletal:         General: Tenderness present.      Right knee: Swelling and effusion present. No deformity, ecchymosis or bony tenderness. Normal range of motion. Tenderness present.     Neurological: She is alert and oriented to person, place, and time.   Skin: Skin is warm.         ED Course   Procedures  Labs Reviewed - No data to display       Imaging Results               US Lower Extremity Veins Right (Final result)  Result time 04/02/24 09:34:49      Final result by Sylvester Goode MD (04/02/24 09:34:49)                   Impression:      No evidence of deep venous thrombosis in the right lower extremity.    2.1 cm right popliteal fossa cyst.      Electronically signed by: Sylvester Goode  Date:    04/02/2024  Time:    09:34               Narrative:    EXAMINATION:  US LOWER EXTREMITY VEINS RIGHT    CLINICAL HISTORY:  knee sweling;    TECHNIQUE:  Duplex and color flow Doppler evaluation and graded compression of the right lower extremity veins was performed.    COMPARISON:  None    FINDINGS:  Right thigh veins: The common femoral, femoral, popliteal, upper greater saphenous, and deep femoral veins are patent and free of thrombus. The veins are normally compressible and have normal phasic flow and augmentation response.    Right calf veins: The visualized calf veins are patent.    Contralateral CFV: The contralateral (left) common femoral vein is patent and free of thrombus.    Miscellaneous: There is a 2.1 x 1.2 x 0.9 cm fluid collection in the right popliteal fossa.                                       Medications   naproxen tablet 500 mg (500 mg Oral Given 4/2/24 0650)   oxyCODONE-acetaminophen 5-325 mg per tablet 1 tablet (1 tablet Oral Given 4/2/24 1023)     Medical Decision Making  43-year-old female presenting to the emergency department for evaluation of right knee pain.  At the time assessment patient is alert oriented in no acute distress.  Knee is tender and edematous but no erythema or pain with passive range of medicine.  Patient given naproxen with minimal improvement in pain.  Time of evaluation low suspicion for septic arthritis or gout at this time.  Likely a bursitis however patient has concerns and age and risk factors DVT ultrasound obtained which was negative at this time.  At this time patient is stable for discharge with instructions and naproxen to  use as needed.  Discussed and understood.  Patient agreeable with plan.    Risk  Prescription drug management.              Attending Attestation:   Physician Attestation Statement for Resident:  As the supervising MD   Physician Attestation Statement: I have personally seen and examined this patient.   I agree with the above history.  -:   As the supervising MD I agree with the above PE.     As the supervising MD I agree with the above treatment, course, plan, and disposition.                                           Clinical Impression:  Final diagnoses:  [M25.561] Acute pain of right knee (Primary)  [M71.21] Popliteal cyst, unruptured, right          ED Disposition Condition    Discharge Stable          ED Prescriptions       Medication Sig Dispense Start Date End Date Auth. Provider    naproxen (NAPROSYN) 500 MG tablet  (Status: Discontinued) Take 1 tablet (500 mg total) by mouth 2 (two) times daily with meals. 60 tablet 4/2/2024 4/2/2024 Kiran Salter MD    naproxen (NAPROSYN) 500 MG tablet Take 1 tablet (500 mg total) by mouth 2 (two) times daily with meals. 60 tablet 4/2/2024 -- Kiran Salter MD          Follow-up Information       Follow up With Specialties Details Why Contact Info    [facility] Dept Emergency Medicine Go to  If symptoms worsen [id number]  [facility] [address] [phone]             Kiran Salter MD  Resident  04/02/24 1008       Maximo Garcia MD  04/04/24 6882

## 2024-04-02 NOTE — Clinical Note
"Yesi Oconnell" Aleksandr was seen and treated in our emergency department on 4/2/2024.  She may return to work on 04/05/2024.       If you have any questions or concerns, please don't hesitate to call.      Lesly Martinez RN    "

## 2024-08-28 ENCOUNTER — OFFICE VISIT (OUTPATIENT)
Dept: OPTOMETRY | Facility: CLINIC | Age: 44
End: 2024-08-28
Payer: COMMERCIAL

## 2024-08-28 DIAGNOSIS — H52.03 HYPEROPIA OF BOTH EYES WITH ASTIGMATISM AND PRESBYOPIA: Primary | ICD-10-CM

## 2024-08-28 DIAGNOSIS — H52.4 HYPEROPIA OF BOTH EYES WITH ASTIGMATISM AND PRESBYOPIA: Primary | ICD-10-CM

## 2024-08-28 DIAGNOSIS — H52.203 HYPEROPIA OF BOTH EYES WITH ASTIGMATISM AND PRESBYOPIA: Primary | ICD-10-CM

## 2024-08-28 DIAGNOSIS — Z86.69 HX OF MIGRAINE HEADACHES: ICD-10-CM

## 2024-08-28 PROCEDURE — 1159F MED LIST DOCD IN RCRD: CPT | Mod: CPTII,S$GLB,, | Performed by: OPTOMETRIST

## 2024-08-28 PROCEDURE — 92015 DETERMINE REFRACTIVE STATE: CPT | Mod: S$GLB,,, | Performed by: OPTOMETRIST

## 2024-08-28 PROCEDURE — 99999 PR PBB SHADOW E&M-EST. PATIENT-LVL II: CPT | Mod: PBBFAC,,, | Performed by: OPTOMETRIST

## 2024-08-28 PROCEDURE — 92004 COMPRE OPH EXAM NEW PT 1/>: CPT | Mod: S$GLB,,, | Performed by: OPTOMETRIST

## 2024-08-28 NOTE — PROGRESS NOTES
"HPI    CC:44 yr old in today for Routine   PHAM:New Patient    (+) Changes in vision   (+) Pain  (-) Irritation   (-) Itching   (-) Flashes  (-) Floaters  (+) Glasses wearer, but lost them   (-) CL wearer  (-) Uses eye gtts    Does patient want a refraction today?     (-) Eye injury  (-) Eye surgery   (-)POHx  (-)FOHx    (-)DM  No results found for: "HGBA1C"         Last edited by Jena Velazquez on 8/28/2024  9:42 AM.            Assessment /Plan     For exam results, see Encounter Report.    Hyperopia of both eyes with astigmatism and presbyopia    Hx of migraine headaches      MONITOR. ED PT ON ALL EXAM FINDINGS  RX FINAL SPECS   OCULAR HEALTH WNL OD, OS  HEADACHES LIKELY SECONDARY TO VISION; DISCUSSED; CONSIDER PCP CONSULT IF SYMPTOMS PERSIST.   RTC 1 YR//PRN FOR REE./DFE      "

## 2024-12-26 DIAGNOSIS — E04.9 ENLARGED THYROID: Primary | ICD-10-CM

## 2025-01-02 ENCOUNTER — HOSPITAL ENCOUNTER (OUTPATIENT)
Dept: RADIOLOGY | Facility: HOSPITAL | Age: 45
Discharge: HOME | End: 2025-01-02
Attending: INTERNAL MEDICINE
Payer: COMMERCIAL

## 2025-01-02 DIAGNOSIS — E04.9 ENLARGED THYROID: ICD-10-CM

## 2025-01-02 DIAGNOSIS — E04.9 ENLARGED THYROID: Primary | ICD-10-CM

## 2025-01-02 PROCEDURE — 76536 US EXAM OF HEAD AND NECK: CPT | Mod: TC

## 2025-01-02 PROCEDURE — 76536 US EXAM OF HEAD AND NECK: CPT | Mod: 26,,, | Performed by: RADIOLOGY

## 2025-01-03 ENCOUNTER — LAB VISIT (OUTPATIENT)
Dept: LAB | Facility: HOSPITAL | Age: 45
End: 2025-01-03
Attending: INTERNAL MEDICINE
Payer: COMMERCIAL

## 2025-01-03 DIAGNOSIS — E04.9 ENLARGED THYROID: ICD-10-CM

## 2025-01-03 DIAGNOSIS — E04.9 ENLARGED THYROID: Primary | ICD-10-CM

## 2025-01-03 LAB
T4 FREE SERPL-MCNC: 1.01 NG/DL (ref 0.71–1.51)
TSH SERPL DL<=0.005 MIU/L-ACNC: 0.75 UIU/ML (ref 0.4–4)

## 2025-01-03 PROCEDURE — 84443 ASSAY THYROID STIM HORMONE: CPT | Performed by: INTERNAL MEDICINE

## 2025-01-03 PROCEDURE — 36415 COLL VENOUS BLD VENIPUNCTURE: CPT | Performed by: INTERNAL MEDICINE

## 2025-01-03 PROCEDURE — 84439 ASSAY OF FREE THYROXINE: CPT | Performed by: INTERNAL MEDICINE

## 2025-01-06 DIAGNOSIS — R00.2 PALPITATIONS: Primary | ICD-10-CM

## 2025-01-09 NOTE — PROGRESS NOTES
Subjective:   Patient ID:  Yesi Brandon is a 44 y.o. female who presents for evaluation of Palpitations      HPI:  She presents today for the evaluation of palpitations.  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  She reports that she has been having intermittent heart palpitations for the past 4 weeks or so.  She describes feeling her heart race.  The palpitations are associated with lightheadedness and shortness of breath.  She has not had any elmo syncopal events.  They lasts for 3-4 minutes at a time and occur multiple times a week.  She does notice them at night and they have been coming more frequent.  She has not noticed any specific triggers.  She does not drink any caffeine or alcohol.  She walks on a treadmill for 30 minutes multiple days a week.  She also reports feeling a constant discomfort on the left side of her chest since the palpitations began.    Her ECG done today and personally reviewed by me shows normal sinus rhythm at 63 beats per minute.    History reviewed. No pertinent past medical history.    Past Surgical History:   Procedure Laterality Date    HYSTERECTOMY         Social History     Socioeconomic History    Marital status: Single   Tobacco Use    Smoking status: Never     Passive exposure: Never    Smokeless tobacco: Never   Substance and Sexual Activity    Alcohol use: Never    Drug use: Never   Social History Narrative    ** Merged History Encounter **         ** Merged History Encounter **          Social Drivers of Health     Financial Resource Strain: Low Risk  (1/7/2025)    Overall Financial Resource Strain (CARDIA)     Difficulty of Paying Living Expenses: Not hard at all   Food Insecurity: No Food Insecurity (1/7/2025)    Hunger Vital Sign     Worried About Running Out of Food in the Last Year: Never true     Ran Out of Food in the Last Year: Never true   Physical Activity: Sufficiently Active (1/7/2025)    Exercise Vital Sign     Days of Exercise per  Week: 5 days     Minutes of Exercise per Session: 30 min   Stress: Stress Concern Present (1/7/2025)    [employer] [place] [employer] - Occupational Stress Questionnaire     Feeling of Stress : To some extent   Housing Stability: Unknown (1/7/2025)    Housing Stability Vital Sign     Unable to Pay for Housing in the Last Year: No       Family History   Problem Relation Name Age of Onset    Heart failure Mother         Patient's Medications   New Prescriptions    No medications on file   Previous Medications    No medications on file   Modified Medications    No medications on file   Discontinued Medications    NAPROXEN (NAPROSYN) 500 MG TABLET    Take 1 tablet (500 mg total) by mouth 2 (two) times daily with meals.    PROMETHAZINE (PHENERGAN) 6.25 MG/5 ML SYRUP    Take 10 mLs (12.5 mg total) by mouth every 6 (six) hours as needed for Nausea (COUGH).       Review of Systems   Constitutional: Negative for malaise/fatigue and weight gain.   HENT:  Negative for hearing loss.    Eyes:  Negative for visual disturbance.   Cardiovascular:  Positive for palpitations. Negative for chest pain, claudication, dyspnea on exertion, leg swelling, near-syncope, orthopnea, paroxysmal nocturnal dyspnea and syncope.   Respiratory:  Negative for cough, shortness of breath, sleep disturbances due to breathing, snoring and wheezing.    Endocrine: Negative for cold intolerance, heat intolerance, polydipsia, polyphagia and polyuria.   Hematologic/Lymphatic: Negative for bleeding problem. Does not bruise/bleed easily.   Skin:  Negative for rash and suspicious lesions.   Musculoskeletal:  Negative for arthritis, falls, joint pain, muscle weakness and myalgias.   Gastrointestinal:  Negative for abdominal pain, change in bowel habit, constipation, diarrhea, heartburn, hematochezia, melena and nausea.   Genitourinary:  Negative for hematuria and nocturia.   Neurological:  Negative for excessive daytime sleepiness, dizziness,  "headaches, light-headedness, loss of balance and weakness.   Psychiatric/Behavioral:  Negative for depression. The patient is not nervous/anxious.    Allergic/Immunologic: Negative for environmental allergies.       /76 (BP Location: Right arm, Patient Position: Sitting)   Pulse 77   Ht 5' 6" (1.676 m)   Wt 102.2 kg (225 lb 5 oz)   LMP  (Exact Date)   SpO2 98%   BMI 36.37 kg/m²     Objective:   Physical Exam  Constitutional:       Appearance: She is well-developed.      Comments:      HENT:      Head: Normocephalic and atraumatic.   Eyes:      General: No scleral icterus.     Conjunctiva/sclera: Conjunctivae normal.      Pupils: Pupils are equal, round, and reactive to light.   Neck:      Thyroid: No thyromegaly.      Vascular: No hepatojugular reflux or JVD.      Trachea: No tracheal deviation.   Cardiovascular:      Rate and Rhythm: Normal rate and regular rhythm.      Chest Wall: PMI is not displaced.      Pulses: Intact distal pulses.           Carotid pulses are 2+ on the right side and 2+ on the left side.       Radial pulses are 2+ on the right side and 2+ on the left side.        Dorsalis pedis pulses are 2+ on the right side and 2+ on the left side.        Posterior tibial pulses are 2+ on the right side and 2+ on the left side.      Heart sounds: Normal heart sounds.   Pulmonary:      Effort: Pulmonary effort is normal.      Breath sounds: Normal breath sounds.   Abdominal:      General: Bowel sounds are normal. There is no distension.      Palpations: Abdomen is soft. There is no mass.      Tenderness: There is no abdominal tenderness.   Musculoskeletal:         General: No tenderness.      Cervical back: Normal range of motion and neck supple.   Lymphadenopathy:      Cervical: No cervical adenopathy.   Skin:     General: Skin is warm and dry.      Findings: No erythema or rash.      Nails: There is no clubbing.   Neurological:      Mental Status: She is alert and oriented to person, place, " and time.   Psychiatric:         Speech: Speech normal.         Behavior: Behavior normal.         Lab Results   Component Value Date     05/31/2023     03/16/2011    K 3.8 05/31/2023    K 3.5 03/16/2011     03/16/2011    CO2 25 05/31/2023    CO2 22 (L) 03/16/2011    BUN 11.0 05/31/2023    BUN 8 03/16/2011    CREATININE 0.89 05/31/2023    CREATININE 0.8 03/16/2011    GLU 99 03/16/2011    AST 15 03/16/2011    ALT 7 (L) 03/16/2011    ALBUMIN 3.7 03/16/2011    PROT 7.4 03/16/2011    BILITOT 0.5 03/16/2011    WBC 0-5 07/27/2023    WBC 8.0 05/31/2023    WBC 12.75 (H) 03/16/2011    HGB 12.0 05/31/2023    HGB 11.6 (L) 03/16/2011    HCT 37.2 05/31/2023    HCT 34.4 (L) 03/16/2011    MCV 89.1 05/31/2023    MCV 89.8 03/16/2011     03/16/2011    TSH 0.749 01/03/2025       Assessment:     1. Palpitations :  Her resting ECG and physical exam were normal.  A recent TSH was normal although the ultrasound of her thyroid reported increased vascularity which can be seen in thyroiditis.  I ordered some labs as well as a 48 hour Holter monitor for further evaluation.       Plan:     Yesi was seen today for palpitations.    Diagnoses and all orders for this visit:    Palpitations  -     Ambulatory referral/consult to Cardiology  -     EKG 12-lead  -     Holter monitor - 48 hour; Future  -     Comprehensive Metabolic Panel; Future  -     Lipid Panel; Future  -     CBC Auto Differential; Future  -     Magnesium; Future        Thank you for allowing me to participate in this patient's care. Please do not hesitate to contact me with any questions or concerns.

## 2025-01-14 ENCOUNTER — OFFICE VISIT (OUTPATIENT)
Dept: CARDIOLOGY | Facility: CLINIC | Age: 45
End: 2025-01-14
Payer: COMMERCIAL

## 2025-01-14 VITALS
SYSTOLIC BLOOD PRESSURE: 112 MMHG | HEIGHT: 66 IN | BODY MASS INDEX: 36.21 KG/M2 | HEART RATE: 77 BPM | WEIGHT: 225.31 LBS | DIASTOLIC BLOOD PRESSURE: 76 MMHG | OXYGEN SATURATION: 98 %

## 2025-01-14 DIAGNOSIS — R00.2 PALPITATIONS: ICD-10-CM

## 2025-01-14 LAB
OHS QRS DURATION: 78 MS
OHS QTC CALCULATION: 425 MS

## 2025-01-14 PROCEDURE — 99999 PR PBB SHADOW E&M-EST. PATIENT-LVL IV: CPT | Mod: PBBFAC,,, | Performed by: INTERNAL MEDICINE

## 2025-01-14 PROCEDURE — 1160F RVW MEDS BY RX/DR IN RCRD: CPT | Mod: CPTII,S$GLB,, | Performed by: INTERNAL MEDICINE

## 2025-01-14 PROCEDURE — 93005 ELECTROCARDIOGRAM TRACING: CPT | Mod: S$GLB,,, | Performed by: INTERNAL MEDICINE

## 2025-01-14 PROCEDURE — 93010 ELECTROCARDIOGRAM REPORT: CPT | Mod: S$GLB,,, | Performed by: INTERNAL MEDICINE

## 2025-01-14 PROCEDURE — 3074F SYST BP LT 130 MM HG: CPT | Mod: CPTII,S$GLB,, | Performed by: INTERNAL MEDICINE

## 2025-01-14 PROCEDURE — 3008F BODY MASS INDEX DOCD: CPT | Mod: CPTII,S$GLB,, | Performed by: INTERNAL MEDICINE

## 2025-01-14 PROCEDURE — 3078F DIAST BP <80 MM HG: CPT | Mod: CPTII,S$GLB,, | Performed by: INTERNAL MEDICINE

## 2025-01-14 PROCEDURE — 99204 OFFICE O/P NEW MOD 45 MIN: CPT | Mod: S$GLB,,, | Performed by: INTERNAL MEDICINE

## 2025-01-14 PROCEDURE — 1159F MED LIST DOCD IN RCRD: CPT | Mod: CPTII,S$GLB,, | Performed by: INTERNAL MEDICINE

## 2025-01-15 ENCOUNTER — LAB VISIT (OUTPATIENT)
Dept: LAB | Facility: HOSPITAL | Age: 45
End: 2025-01-15
Payer: COMMERCIAL

## 2025-01-15 DIAGNOSIS — R00.2 PALPITATIONS: ICD-10-CM

## 2025-01-15 LAB
ALBUMIN SERPL BCP-MCNC: 3.6 G/DL (ref 3.5–5.2)
ALP SERPL-CCNC: 66 U/L (ref 40–150)
ALT SERPL W/O P-5'-P-CCNC: 14 U/L (ref 10–44)
ANION GAP SERPL CALC-SCNC: 7 MMOL/L (ref 8–16)
AST SERPL-CCNC: 17 U/L (ref 10–40)
BASOPHILS # BLD AUTO: 0.07 K/UL (ref 0–0.2)
BASOPHILS NFR BLD: 0.9 % (ref 0–1.9)
BILIRUB SERPL-MCNC: 0.3 MG/DL (ref 0.1–1)
BUN SERPL-MCNC: 12 MG/DL (ref 6–20)
CALCIUM SERPL-MCNC: 8.6 MG/DL (ref 8.7–10.5)
CHLORIDE SERPL-SCNC: 111 MMOL/L (ref 95–110)
CHOLEST SERPL-MCNC: 140 MG/DL (ref 120–199)
CHOLEST/HDLC SERPL: 3.4 {RATIO} (ref 2–5)
CO2 SERPL-SCNC: 23 MMOL/L (ref 23–29)
CREAT SERPL-MCNC: 0.8 MG/DL (ref 0.5–1.4)
DIFFERENTIAL METHOD BLD: ABNORMAL
EOSINOPHIL # BLD AUTO: 0.2 K/UL (ref 0–0.5)
EOSINOPHIL NFR BLD: 2.4 % (ref 0–8)
ERYTHROCYTE [DISTWIDTH] IN BLOOD BY AUTOMATED COUNT: 13.9 % (ref 11.5–14.5)
EST. GFR  (NO RACE VARIABLE): >60 ML/MIN/1.73 M^2
GLUCOSE SERPL-MCNC: 87 MG/DL (ref 70–110)
HCT VFR BLD AUTO: 33.5 % (ref 37–48.5)
HDLC SERPL-MCNC: 41 MG/DL (ref 40–75)
HDLC SERPL: 29.3 % (ref 20–50)
HGB BLD-MCNC: 10.8 G/DL (ref 12–16)
IMM GRANULOCYTES # BLD AUTO: 0.03 K/UL (ref 0–0.04)
IMM GRANULOCYTES NFR BLD AUTO: 0.4 % (ref 0–0.5)
LDLC SERPL CALC-MCNC: 87.4 MG/DL (ref 63–159)
LYMPHOCYTES # BLD AUTO: 2.6 K/UL (ref 1–4.8)
LYMPHOCYTES NFR BLD: 34.2 % (ref 18–48)
MAGNESIUM SERPL-MCNC: 2.1 MG/DL (ref 1.6–2.6)
MCH RBC QN AUTO: 29.8 PG (ref 27–31)
MCHC RBC AUTO-ENTMCNC: 32.2 G/DL (ref 32–36)
MCV RBC AUTO: 93 FL (ref 82–98)
MONOCYTES # BLD AUTO: 0.4 K/UL (ref 0.3–1)
MONOCYTES NFR BLD: 5.2 % (ref 4–15)
NEUTROPHILS # BLD AUTO: 4.2 K/UL (ref 1.8–7.7)
NEUTROPHILS NFR BLD: 56.9 % (ref 38–73)
NONHDLC SERPL-MCNC: 99 MG/DL
NRBC BLD-RTO: 0 /100 WBC
PLATELET # BLD AUTO: 297 K/UL (ref 150–450)
PMV BLD AUTO: 9.6 FL (ref 9.2–12.9)
POTASSIUM SERPL-SCNC: 3.9 MMOL/L (ref 3.5–5.1)
PROT SERPL-MCNC: 6.9 G/DL (ref 6–8.4)
RBC # BLD AUTO: 3.62 M/UL (ref 4–5.4)
SODIUM SERPL-SCNC: 141 MMOL/L (ref 136–145)
TRIGL SERPL-MCNC: 58 MG/DL (ref 30–150)
WBC # BLD AUTO: 7.45 K/UL (ref 3.9–12.7)

## 2025-01-15 PROCEDURE — 80061 LIPID PANEL: CPT | Performed by: INTERNAL MEDICINE

## 2025-01-15 PROCEDURE — 36415 COLL VENOUS BLD VENIPUNCTURE: CPT | Performed by: INTERNAL MEDICINE

## 2025-01-15 PROCEDURE — 80053 COMPREHEN METABOLIC PANEL: CPT | Performed by: INTERNAL MEDICINE

## 2025-01-15 PROCEDURE — 85025 COMPLETE CBC W/AUTO DIFF WBC: CPT | Performed by: INTERNAL MEDICINE

## 2025-01-15 PROCEDURE — 83735 ASSAY OF MAGNESIUM: CPT | Performed by: INTERNAL MEDICINE

## 2025-01-28 ENCOUNTER — LAB VISIT (OUTPATIENT)
Dept: LAB | Facility: HOSPITAL | Age: 45
End: 2025-01-28
Attending: INTERNAL MEDICINE
Payer: COMMERCIAL

## 2025-01-28 DIAGNOSIS — D64.9 NORMOCYTIC ANEMIA: ICD-10-CM

## 2025-01-28 DIAGNOSIS — D64.9 NORMOCYTIC ANEMIA: Primary | ICD-10-CM

## 2025-01-28 LAB
FERRITIN SERPL-MCNC: 36 NG/ML (ref 20–300)
IRON SERPL-MCNC: 120 UG/DL (ref 30–160)
RETICS/RBC NFR AUTO: 2.1 % (ref 0.5–2.5)
SATURATED IRON: 32 % (ref 20–50)
TOTAL IRON BINDING CAPACITY: 377 UG/DL (ref 250–450)
TRANSFERRIN SERPL-MCNC: 255 MG/DL (ref 200–375)

## 2025-01-28 PROCEDURE — 84466 ASSAY OF TRANSFERRIN: CPT | Performed by: INTERNAL MEDICINE

## 2025-01-28 PROCEDURE — 82728 ASSAY OF FERRITIN: CPT | Performed by: INTERNAL MEDICINE

## 2025-01-28 PROCEDURE — 36415 COLL VENOUS BLD VENIPUNCTURE: CPT | Performed by: INTERNAL MEDICINE

## 2025-01-28 PROCEDURE — 85045 AUTOMATED RETICULOCYTE COUNT: CPT | Performed by: INTERNAL MEDICINE

## 2025-02-03 ENCOUNTER — CLINICAL SUPPORT (OUTPATIENT)
Dept: CARDIOLOGY | Facility: HOSPITAL | Age: 45
End: 2025-02-03
Attending: INTERNAL MEDICINE
Payer: COMMERCIAL

## 2025-02-03 DIAGNOSIS — R00.2 PALPITATIONS: ICD-10-CM

## 2025-02-03 PROCEDURE — 93227 XTRNL ECG REC<48 HR R&I: CPT | Mod: ,,, | Performed by: INTERNAL MEDICINE

## 2025-02-03 PROCEDURE — 93226 XTRNL ECG REC<48 HR SCAN A/R: CPT

## 2025-02-06 LAB
OHS CV EVENT MONITOR DAY: 0
OHS CV HOLTER LENGTH DECIMAL HOURS: 47.98
OHS CV HOLTER LENGTH HOURS: 47
OHS CV HOLTER LENGTH MINUTES: 59
OHS CV HOLTER SINUS AVERAGE HR: 78
OHS CV HOLTER SINUS MAX HR: 130
OHS CV HOLTER SINUS MIN HR: 48

## 2025-02-26 ENCOUNTER — CLINICAL SUPPORT (OUTPATIENT)
Dept: OTHER | Facility: CLINIC | Age: 45
End: 2025-02-26

## 2025-02-26 DIAGNOSIS — Z00.8 ENCOUNTER FOR OTHER GENERAL EXAMINATION: ICD-10-CM

## 2025-02-27 VITALS
BODY MASS INDEX: 34.72 KG/M2 | DIASTOLIC BLOOD PRESSURE: 86 MMHG | SYSTOLIC BLOOD PRESSURE: 128 MMHG | WEIGHT: 216 LBS | HEIGHT: 66 IN

## 2025-02-27 LAB
GLUCOSE SERPL-MCNC: 113 MG/DL (ref 60–140)
HDLC SERPL-MCNC: 51 MG/DL
POC CHOLESTEROL, LDL (DOCK): 107 MG/DL
POC CHOLESTEROL, TOTAL: 179 MG/DL
TRIGL SERPL-MCNC: 118 MG/DL

## 2025-02-28 ENCOUNTER — LAB VISIT (OUTPATIENT)
Dept: LAB | Facility: HOSPITAL | Age: 45
End: 2025-02-28
Attending: INTERNAL MEDICINE
Payer: COMMERCIAL

## 2025-02-28 DIAGNOSIS — E04.9 ENLARGED THYROID: ICD-10-CM

## 2025-02-28 LAB
T4 FREE SERPL-MCNC: 0.95 NG/DL (ref 0.71–1.51)
TSH SERPL DL<=0.005 MIU/L-ACNC: 1.18 UIU/ML (ref 0.4–4)

## 2025-02-28 PROCEDURE — 84439 ASSAY OF FREE THYROXINE: CPT | Performed by: INTERNAL MEDICINE

## 2025-02-28 PROCEDURE — 36415 COLL VENOUS BLD VENIPUNCTURE: CPT | Performed by: INTERNAL MEDICINE

## 2025-02-28 PROCEDURE — 84443 ASSAY THYROID STIM HORMONE: CPT | Performed by: INTERNAL MEDICINE

## 2025-04-07 ENCOUNTER — HOSPITAL ENCOUNTER (OUTPATIENT)
Dept: RADIOLOGY | Facility: HOSPITAL | Age: 45
Discharge: HOME | End: 2025-04-07
Attending: INTERNAL MEDICINE
Payer: COMMERCIAL

## 2025-04-07 DIAGNOSIS — M79.89 LEG SWELLING: Primary | ICD-10-CM

## 2025-04-07 DIAGNOSIS — M79.89 LEG SWELLING: ICD-10-CM

## 2025-04-07 PROCEDURE — 93971 EXTREMITY STUDY: CPT | Mod: TC,RT

## 2025-04-07 PROCEDURE — 93971 EXTREMITY STUDY: CPT | Mod: 26,RT,, | Performed by: RADIOLOGY

## 2025-04-17 DIAGNOSIS — Z12.31 SCREENING MAMMOGRAM FOR BREAST CANCER: Primary | ICD-10-CM
